# Patient Record
Sex: FEMALE | Employment: UNEMPLOYED | URBAN - METROPOLITAN AREA
[De-identification: names, ages, dates, MRNs, and addresses within clinical notes are randomized per-mention and may not be internally consistent; named-entity substitution may affect disease eponyms.]

---

## 2017-04-26 ENCOUNTER — HOSPITAL ENCOUNTER (INPATIENT)
Age: 22
LOS: 12 days | Discharge: HOME | DRG: 885 | End: 2017-05-08
Attending: PSYCHIATRY & NEUROLOGY | Admitting: PSYCHIATRY & NEUROLOGY
Payer: COMMERCIAL

## 2017-04-26 PROCEDURE — 74011250636 HC RX REV CODE- 250/636: Performed by: PSYCHIATRY & NEUROLOGY

## 2017-04-26 PROCEDURE — 65220000003 HC RM SEMIPRIVATE PSYCH

## 2017-04-26 RX ORDER — LORAZEPAM 1 MG/1
1-2 TABLET ORAL
Status: DISCONTINUED | OUTPATIENT
Start: 2017-04-26 | End: 2017-05-03

## 2017-04-26 RX ORDER — HALOPERIDOL 5 MG/1
5 TABLET ORAL
Status: DISCONTINUED | OUTPATIENT
Start: 2017-04-26 | End: 2017-05-08 | Stop reason: HOSPADM

## 2017-04-26 RX ORDER — LITHIUM CARBONATE 300 MG
300 TABLET ORAL DAILY
Status: DISCONTINUED | OUTPATIENT
Start: 2017-04-27 | End: 2017-04-27

## 2017-04-26 RX ORDER — PROPRANOLOL HYDROCHLORIDE 10 MG/1
20 TABLET ORAL 2 TIMES DAILY
Status: DISCONTINUED | OUTPATIENT
Start: 2017-04-26 | End: 2017-05-08 | Stop reason: HOSPADM

## 2017-04-26 RX ORDER — LORAZEPAM 2 MG/ML
1-2 INJECTION INTRAMUSCULAR
Status: DISCONTINUED | OUTPATIENT
Start: 2017-04-26 | End: 2017-05-08 | Stop reason: HOSPADM

## 2017-04-26 RX ORDER — LITHIUM CARBONATE 300 MG
600 TABLET ORAL
Status: DISCONTINUED | OUTPATIENT
Start: 2017-04-26 | End: 2017-04-27

## 2017-04-26 RX ORDER — HALOPERIDOL 5 MG/ML
5 INJECTION INTRAMUSCULAR
Status: DISCONTINUED | OUTPATIENT
Start: 2017-04-26 | End: 2017-05-08 | Stop reason: HOSPADM

## 2017-04-26 RX ORDER — OLANZAPINE 5 MG/1
10 TABLET, ORALLY DISINTEGRATING ORAL
Status: DISCONTINUED | OUTPATIENT
Start: 2017-04-26 | End: 2017-04-27

## 2017-04-26 RX ORDER — TRAZODONE HYDROCHLORIDE 50 MG/1
50 TABLET ORAL
Status: DISCONTINUED | OUTPATIENT
Start: 2017-04-26 | End: 2017-04-28

## 2017-04-26 RX ADMIN — LORAZEPAM 2 MG: 2 INJECTION INTRAMUSCULAR; INTRAVENOUS at 21:46

## 2017-04-26 RX ADMIN — HALOPERIDOL LACTATE 5 MG: 5 INJECTION, SOLUTION INTRAMUSCULAR at 21:46

## 2017-04-26 NOTE — IP AVS SNAPSHOT
[address] Patient: Amanuel Farrar MRN: [id number] You are allergic to the following No active allergies Recent Documentation Height Weight BMI Smoking Status 1.651 m 72.6 kg 26.63 kg/m2 Current Every Day Smoker Emergency Contacts Name Discharge Info Relation Home Work Mobile Alfred Coon N/A  AT THIS TIME [6] Mother [14] [phone] About your hospitalization You were admitted on:  April 26, 2017 You last received care in the:  [facility] You were discharged on: May 8, 2017 Unit phone number:  [phone] Why you were hospitalized Your primary diagnosis was:  Not on File Your diagnoses also included:  Bipolar 1 Disorder (Hcc) Providers Seen During Your Hospitalizations Provider Role Specialty Primary office phone Kya Batres MD Attending Provider Psychiatry [phone] Your Primary Care Physician (PCP) Primary Care Physician Office Phone Office Fax NONE ** None ** ** None ** Follow-up Information Follow up With Details Comments Contact Info Pt. Has a scheduled therapy appointment with Mrs. Apodaca @ 3:00 on 5/12/17 and  Brian Galan for medication management on 5/24/17 @ 3:00 and  [place] [address] Jaylin [address] [place]za, [place] [address]  Phone: [phone] Pt given card with numbers to remember. Current Discharge Medication List  
  
START taking these medications Dose & Instructions Dispensing Information Comments Morning Noon Evening Bedtime  
 hydrOXYzine pamoate 50 mg capsule Commonly known as:  VISTARIL Your last dose was: Your next dose is:    
   
   
 Dose:  50 mg Take 1 Cap by mouth three (3) times daily as needed for Anxiety for up to 14 days. Indications: anxiety Quantity:  42 Cap Refills:  0  
     
   
   
   
  
 * lithium carbonate 300 mg tablet Replaces:  lithium carbonate 300 mg capsule Your last dose was: Your next dose is:    
   
   
 Dose:  300 mg Take 1 Tab by mouth two (2) times a day for 30 days. Indications: Kalee associated with Bipolar Disorder Quantity:  60 Tab Refills:  0  
     
   
   
   
  
 * lithium carbonate 300 mg tablet Replaces:  lithium carbonate 600 mg capsule Your last dose was: Your next dose is:    
   
   
 Dose:  600 mg Take 2 Tabs by mouth nightly for 30 days. Indications: Kalee associated with Bipolar Disorder Quantity:  60 Tab Refills:  0  
     
   
   
   
  
 * Notice: This list has 2 medication(s) that are the same as other medications prescribed for you. Read the directions carefully, and ask your doctor or other care provider to review them with you. CONTINUE these medications which have CHANGED Dose & Instructions Dispensing Information Comments Morning Noon Evening Bedtime * OLANZapine 20 mg disintegrating tablet Commonly known as:  ZyPREXA zydis What changed:   
- medication strength 
- how much to take Your last dose was: Your next dose is:    
   
   
 Dose:  20 mg Take 1 Tab by mouth nightly for 30 days. Indications: Kalee associated with Bipolar Disorder Quantity:  30 Tab Refills:  0  
     
   
   
   
  
 * OLANZapine 5 mg disintegrating tablet Commonly known as:  ZyPREXA zydis What changed: You were already taking a medication with the same name, and this prescription was added. Make sure you understand how and when to take each. Your last dose was: Your next dose is:    
   
   
 Dose:  5 mg Take 1 Tab by mouth daily for 30 days. Indications: Kalee associated with Bipolar Disorder Quantity:  30 Tab Refills:  0 * Notice: This list has 2 medication(s) that are the same as other medications prescribed for you. Read the directions carefully, and ask your doctor or other care provider to review them with you. CONTINUE these medications which have NOT CHANGED Dose & Instructions Dispensing Information Comments Morning Noon Evening Bedtime  
 propranolol 20 mg tablet Commonly known as:  INDERAL Your last dose was: Your next dose is:    
   
   
 Dose:  20 mg Take 1 Tab by mouth two (2) times a day for 30 days. Indications: SINUS TACHYCARDIA SECONDARY TO ANTIPSYCHOTIC MEDICATION Quantity:  60 Tab Refills:  0 STOP taking these medications   
 lithium carbonate 300 mg capsule Replaced by:  lithium carbonate 300 mg tablet  
   
  
 lithium carbonate 600 mg capsule Replaced by:  lithium carbonate 300 mg tablet Where to Get Your Medications Information on where to get these meds will be given to you by the nurse or doctor. ! Ask your nurse or doctor about these medications  
  hydrOXYzine pamoate 50 mg capsule  
 lithium carbonate 300 mg tablet  
 lithium carbonate 300 mg tablet OLANZapine 20 mg disintegrating tablet OLANZapine 5 mg disintegrating tablet  
 propranolol 20 mg tablet Discharge Instructions BEHAVIORAL HEALTH NURSING DISCHARGE NOTE The following personal items collected during your admission are returned to you:  
Dental Appliance: Dental Appliances: None Vision:   
Hearing Aid:   
Jewelry: Jewelry: None Clothing: Clothing: Pants, Shirt (1 pr yellow flip-flops) Other Valuables: Other Valuables: None Valuables sent to safe:   
 
 
PATIENT INSTRUCTIONS: 
 
. Regular diet The discharge information has been reviewed with the patient. The patient verbalized understanding. Patient armband removed and shredded Discharge Orders None MyChart Announcement We are excited to announce that we are making your provider's discharge notes available to you in [employer]. You will see these notes when they are completed and signed by the physician that discharged you from your recent hospital stay. If you have any questions or concerns about any information you see in [employer], please call the Health Information Department where you were seen or reach out to your Primary Care Provider for more information about your plan of care. Introducing [facility]! Dear Connor Lyn: Thank you for requesting a [employer] account. Our records indicate that you already have an active [employer] account. You can access your account anytime at [email or web address] Did you know that you can access your hospital and ER discharge instructions at any time in [employer]? You can also review all of your test results from your hospital stay or ER visit. Additional Information If you have questions, please visit the Frequently Asked Questions section of the [employer] website at [email or web address] Remember, [employer] is NOT to be used for urgent needs. For medical emergencies, dial 911. Now available from your iPhone and Android! General Information Please provide this summary of care documentation to your next provider. Patient Signature:  ____________________________________________________________ Date:  ____________________________________________________________  
  
Mel Dallas Provider Signature:  ____________________________________________________________ Date:  ____________________________________________________________

## 2017-04-26 NOTE — IP AVS SNAPSHOT
Current Discharge Medication List  
  
START taking these medications Dose & Instructions Dispensing Information Comments Morning Noon Evening Bedtime  
 hydrOXYzine pamoate 50 mg capsule Commonly known as:  VISTARIL Your last dose was: Your next dose is:    
   
   
 Dose:  50 mg Take 1 Cap by mouth three (3) times daily as needed for Anxiety for up to 14 days. Indications: anxiety Quantity:  42 Cap Refills:  0  
     
   
   
   
  
 * lithium carbonate 300 mg tablet Replaces:  lithium carbonate 300 mg capsule Your last dose was: Your next dose is:    
   
   
 Dose:  300 mg Take 1 Tab by mouth two (2) times a day for 30 days. Indications: Kalee associated with Bipolar Disorder Quantity:  60 Tab Refills:  0  
     
   
   
   
  
 * lithium carbonate 300 mg tablet Replaces:  lithium carbonate 600 mg capsule Your last dose was: Your next dose is:    
   
   
 Dose:  600 mg Take 2 Tabs by mouth nightly for 30 days. Indications: Kalee associated with Bipolar Disorder Quantity:  60 Tab Refills:  0  
     
   
   
   
  
 * Notice: This list has 2 medication(s) that are the same as other medications prescribed for you. Read the directions carefully, and ask your doctor or other care provider to review them with you. CONTINUE these medications which have CHANGED Dose & Instructions Dispensing Information Comments Morning Noon Evening Bedtime * OLANZapine 20 mg disintegrating tablet Commonly known as:  ZyPREXA zydis What changed:   
- medication strength 
- how much to take Your last dose was: Your next dose is:    
   
   
 Dose:  20 mg Take 1 Tab by mouth nightly for 30 days. Indications: Kalee associated with Bipolar Disorder Quantity:  30 Tab Refills:  0  
     
   
   
   
  
 * OLANZapine 5 mg disintegrating tablet Commonly known as:  ZyPREXA zydis What changed: You were already taking a medication with the same name, and this prescription was added. Make sure you understand how and when to take each. Your last dose was: Your next dose is:    
   
   
 Dose:  5 mg Take 1 Tab by mouth daily for 30 days. Indications: Kalee associated with Bipolar Disorder Quantity:  30 Tab Refills:  0  
     
   
   
   
  
 * Notice: This list has 2 medication(s) that are the same as other medications prescribed for you. Read the directions carefully, and ask your doctor or other care provider to review them with you. CONTINUE these medications which have NOT CHANGED Dose & Instructions Dispensing Information Comments Morning Noon Evening Bedtime  
 propranolol 20 mg tablet Commonly known as:  INDERAL Your last dose was: Your next dose is:    
   
   
 Dose:  20 mg Take 1 Tab by mouth two (2) times a day for 30 days. Indications: SINUS TACHYCARDIA SECONDARY TO ANTIPSYCHOTIC MEDICATION Quantity:  60 Tab Refills:  0 STOP taking these medications   
 lithium carbonate 300 mg capsule Replaced by:  lithium carbonate 300 mg tablet  
   
  
 lithium carbonate 600 mg capsule Replaced by:  lithium carbonate 300 mg tablet Where to Get Your Medications Information on where to get these meds will be given to you by the nurse or doctor. ! Ask your nurse or doctor about these medications  
  hydrOXYzine pamoate 50 mg capsule  
 lithium carbonate 300 mg tablet  
 lithium carbonate 300 mg tablet OLANZapine 20 mg disintegrating tablet OLANZapine 5 mg disintegrating tablet  
 propranolol 20 mg tablet

## 2017-04-26 NOTE — IP AVS SNAPSHOT
Summary of Care Report The Summary of Care report has been created to help improve care coordination. Users with access to [employer] or [address] (Web-based application) may access additional patient information including the Discharge Summary. If you are not currently a [address] user and need more information, please call the number listed below in the [place] [address] section and ask to be connected with Medical Records. Facility Information Name Address Phone [place] [address] [facility] [address] [phone] Patient Information Patient Name Sex  Uri Vieyra ([id number]) Female 1995 Discharge Information Admitting Provider Service Area Unit Dania June MD / Brigitte Patton [address] / [phone] Discharge Provider Discharge Date/Time Discharge Disposition Destination (none) 2017 Evening (Pending) AHR (none) Patient Language Language ENGLISH [13] Hospital Problems as of 2017  Never Reviewed Class Noted - Resolved Last Modified POA Active Problems Bipolar 1 disorder [facility]  2016 - Present 2017 by Dania June MD Unknown Entered by Shelley Amor MD  
  
You are allergic to the following No active allergies Current Discharge Medication List  
  
START taking these medications Dose & Instructions Dispensing Information Comments  
 hydrOXYzine pamoate 50 mg capsule Commonly known as:  VISTARIL Dose:  50 mg Take 1 Cap by mouth three (3) times daily as needed for Anxiety for up to 14 days. Indications: anxiety Quantity:  42 Cap Refills:  0  
   
 * lithium carbonate 300 mg tablet Replaces:  lithium carbonate 300 mg capsule  Dose:  300 mg  
 Take 1 Tab by mouth two (2) times a day for 30 days. Indications: Kalee associated with Bipolar Disorder Quantity:  60 Tab Refills:  0  
   
 * lithium carbonate 300 mg tablet Replaces:  lithium carbonate 600 mg capsule Dose:  600 mg Take 2 Tabs by mouth nightly for 30 days. Indications: Kalee associated with Bipolar Disorder Quantity:  60 Tab Refills:  0  
   
 * Notice: This list has 2 medication(s) that are the same as other medications prescribed for you. Read the directions carefully, and ask your doctor or other care provider to review them with you. CONTINUE these medications which have CHANGED Dose & Instructions Dispensing Information Comments * OLANZapine 20 mg disintegrating tablet Commonly known as:  ZyPREXA zydis What changed:   
- medication strength 
- how much to take Dose:  20 mg Take 1 Tab by mouth nightly for 30 days. Indications: Kalee associated with Bipolar Disorder Quantity:  30 Tab Refills:  0  
   
 * OLANZapine 5 mg disintegrating tablet Commonly known as:  ZyPREXA zydis What changed: You were already taking a medication with the same name, and this prescription was added. Make sure you understand how and when to take each. Dose:  5 mg Take 1 Tab by mouth daily for 30 days. Indications: Kalee associated with Bipolar Disorder Quantity:  30 Tab Refills:  0  
   
 * Notice: This list has 2 medication(s) that are the same as other medications prescribed for you. Read the directions carefully, and ask your doctor or other care provider to review them with you. CONTINUE these medications which have NOT CHANGED Dose & Instructions Dispensing Information Comments  
 propranolol 20 mg tablet Commonly known as:  INDERAL Dose:  20 mg Take 1 Tab by mouth two (2) times a day for 30 days. Indications: SINUS TACHYCARDIA SECONDARY TO ANTIPSYCHOTIC MEDICATION Quantity:  60 Tab Refills:  0 STOP taking these medications Comments  
 lithium carbonate 300 mg capsule Replaced by:  lithium carbonate 300 mg tablet  
   
   
 lithium carbonate 600 mg capsule Replaced by:  lithium carbonate 300 mg tablet Current Immunizations Name Date Influenza Vaccine (Quad) PF 11/18/2016 Follow-up Information Follow up With Details Comments Contact Info Pt. Has a scheduled therapy appointment with Mrs. Apodaca @ 3:00 on 5/12/17 and  Adrián Neil for medication management on 5/24/17 @ 3:00 and  [address] [facility] [address]  Phone: [phone] Pt given card with numbers to remember. Discharge Instructions BEHAVIORAL HEALTH NURSING DISCHARGE NOTE The following personal items collected during your admission are returned to you:  
Dental Appliance: Dental Appliances: None Vision:   
Hearing Aid:   
Jewelry: Jewelry: None Clothing: Clothing: Pants, Shirt (1 pr yellow flip-flops) Other Valuables: Other Valuables: None Valuables sent to safe:   
 
 
PATIENT INSTRUCTIONS: 
 
. Regular diet The discharge information has been reviewed with the patient. The patient verbalized understanding. Patient armband removed and shredded Chart Review Routing History Recipient Method Report Sent By Jenny Mike MD  
Phone: [phone] [employer] [address] [place], [place] [[id number]] 4/14/2016  8:38 AM 04/14/2016

## 2017-04-27 LAB
ALBUMIN SERPL BCP-MCNC: 4.1 G/DL (ref 3.4–5)
ALBUMIN/GLOB SERPL: 1 {RATIO} (ref 0.8–1.7)
ALP SERPL-CCNC: 79 U/L (ref 45–117)
ALT SERPL-CCNC: 46 U/L (ref 13–56)
AMPHET UR QL SCN: NEGATIVE
ANION GAP BLD CALC-SCNC: 7 MMOL/L (ref 3–18)
APPEARANCE UR: CLEAR
AST SERPL W P-5'-P-CCNC: 34 U/L (ref 15–37)
BACTERIA URNS QL MICRO: ABNORMAL /HPF
BARBITURATES UR QL SCN: NEGATIVE
BASOPHILS # BLD AUTO: 0 K/UL (ref 0–0.1)
BASOPHILS # BLD: 0 % (ref 0–2)
BENZODIAZ UR QL: NEGATIVE
BILIRUB SERPL-MCNC: 1.3 MG/DL (ref 0.2–1)
BILIRUB UR QL: NEGATIVE
BUN SERPL-MCNC: 10 MG/DL (ref 7–18)
BUN/CREAT SERPL: 9 (ref 12–20)
CALCIUM SERPL-MCNC: 9.6 MG/DL (ref 8.5–10.1)
CANNABINOIDS UR QL SCN: POSITIVE
CHLORIDE SERPL-SCNC: 102 MMOL/L (ref 100–108)
CO2 SERPL-SCNC: 29 MMOL/L (ref 21–32)
COCAINE UR QL SCN: NEGATIVE
COLOR UR: YELLOW
CREAT SERPL-MCNC: 1.11 MG/DL (ref 0.6–1.3)
DIFFERENTIAL METHOD BLD: ABNORMAL
EOSINOPHIL # BLD: 0.1 K/UL (ref 0–0.4)
EOSINOPHIL NFR BLD: 1 % (ref 0–5)
EPITH CASTS URNS QL MICRO: ABNORMAL /LPF (ref 0–5)
ERYTHROCYTE [DISTWIDTH] IN BLOOD BY AUTOMATED COUNT: 12.9 % (ref 11.6–14.5)
GLOBULIN SER CALC-MCNC: 4.2 G/DL (ref 2–4)
GLUCOSE SERPL-MCNC: 85 MG/DL (ref 74–99)
GLUCOSE UR STRIP.AUTO-MCNC: NEGATIVE MG/DL
HCG SERPL QL: NEGATIVE
HCT VFR BLD AUTO: 40.5 % (ref 35–45)
HDSCOM,HDSCOM: ABNORMAL
HGB BLD-MCNC: 14.3 G/DL (ref 12–16)
HGB UR QL STRIP: ABNORMAL
KETONES UR QL STRIP.AUTO: NEGATIVE MG/DL
LEUKOCYTE ESTERASE UR QL STRIP.AUTO: NEGATIVE
LITHIUM SERPL-SCNC: <0.2 MMOL/L (ref 0.6–1.2)
LYMPHOCYTES # BLD AUTO: 26 % (ref 21–52)
LYMPHOCYTES # BLD: 3.3 K/UL (ref 0.9–3.6)
MCH RBC QN AUTO: 32.1 PG (ref 24–34)
MCHC RBC AUTO-ENTMCNC: 35.3 G/DL (ref 31–37)
MCV RBC AUTO: 90.8 FL (ref 74–97)
METHADONE UR QL: NEGATIVE
MONOCYTES # BLD: 1.4 K/UL (ref 0.05–1.2)
MONOCYTES NFR BLD AUTO: 11 % (ref 3–10)
NEUTS SEG # BLD: 7.9 K/UL (ref 1.8–8)
NEUTS SEG NFR BLD AUTO: 62 % (ref 40–73)
NITRITE UR QL STRIP.AUTO: NEGATIVE
OPIATES UR QL: NEGATIVE
PCP UR QL: NEGATIVE
PH UR STRIP: 6 [PH] (ref 5–8)
PLATELET # BLD AUTO: 219 K/UL (ref 135–420)
PMV BLD AUTO: 11.2 FL (ref 9.2–11.8)
POTASSIUM SERPL-SCNC: 3.5 MMOL/L (ref 3.5–5.5)
PROT SERPL-MCNC: 8.3 G/DL (ref 6.4–8.2)
PROT UR STRIP-MCNC: NEGATIVE MG/DL
RBC # BLD AUTO: 4.46 M/UL (ref 4.2–5.3)
SODIUM SERPL-SCNC: 138 MMOL/L (ref 136–145)
SP GR UR REFRACTOMETRY: 1.01 (ref 1–1.03)
TSH SERPL DL<=0.05 MIU/L-ACNC: 0.55 UIU/ML (ref 0.36–3.74)
UROBILINOGEN UR QL STRIP.AUTO: 1 EU/DL (ref 0.2–1)
WBC # BLD AUTO: 12.7 K/UL (ref 4.6–13.2)
WBC URNS QL MICRO: ABNORMAL /HPF (ref 0–4)

## 2017-04-27 PROCEDURE — 74011250637 HC RX REV CODE- 250/637: Performed by: PSYCHIATRY & NEUROLOGY

## 2017-04-27 PROCEDURE — 87086 URINE CULTURE/COLONY COUNT: CPT | Performed by: PSYCHIATRY & NEUROLOGY

## 2017-04-27 PROCEDURE — 81001 URINALYSIS AUTO W/SCOPE: CPT | Performed by: PSYCHIATRY & NEUROLOGY

## 2017-04-27 PROCEDURE — 80307 DRUG TEST PRSMV CHEM ANLYZR: CPT | Performed by: PSYCHIATRY & NEUROLOGY

## 2017-04-27 PROCEDURE — 84703 CHORIONIC GONADOTROPIN ASSAY: CPT | Performed by: PSYCHIATRY & NEUROLOGY

## 2017-04-27 PROCEDURE — 80178 ASSAY OF LITHIUM: CPT

## 2017-04-27 PROCEDURE — 36415 COLL VENOUS BLD VENIPUNCTURE: CPT

## 2017-04-27 PROCEDURE — 80053 COMPREHEN METABOLIC PANEL: CPT

## 2017-04-27 PROCEDURE — 65220000003 HC RM SEMIPRIVATE PSYCH

## 2017-04-27 PROCEDURE — 84443 ASSAY THYROID STIM HORMONE: CPT

## 2017-04-27 PROCEDURE — 85025 COMPLETE CBC W/AUTO DIFF WBC: CPT

## 2017-04-27 RX ORDER — LITHIUM CARBONATE 300 MG
300 TABLET ORAL 3 TIMES DAILY
Status: DISCONTINUED | OUTPATIENT
Start: 2017-04-27 | End: 2017-05-02

## 2017-04-27 RX ORDER — OLANZAPINE 5 MG/1
10 TABLET, ORALLY DISINTEGRATING ORAL
Status: DISCONTINUED | OUTPATIENT
Start: 2017-04-27 | End: 2017-04-28

## 2017-04-27 RX ORDER — OLANZAPINE 5 MG/1
5 TABLET, ORALLY DISINTEGRATING ORAL DAILY
Status: DISCONTINUED | OUTPATIENT
Start: 2017-04-27 | End: 2017-05-08 | Stop reason: HOSPADM

## 2017-04-27 RX ADMIN — LITHIUM CARBONATE 300 MG: 300 TABLET ORAL at 14:11

## 2017-04-27 RX ADMIN — LITHIUM CARBONATE 300 MG: 300 TABLET ORAL at 21:31

## 2017-04-27 RX ADMIN — PROPRANOLOL HYDROCHLORIDE 20 MG: 10 TABLET ORAL at 11:50

## 2017-04-27 RX ADMIN — PROPRANOLOL HYDROCHLORIDE 20 MG: 10 TABLET ORAL at 21:32

## 2017-04-27 RX ADMIN — OLANZAPINE 5 MG: 5 TABLET, ORALLY DISINTEGRATING ORAL at 14:11

## 2017-04-27 RX ADMIN — LORAZEPAM 2 MG: 1 TABLET ORAL at 22:22

## 2017-04-27 RX ADMIN — OLANZAPINE 10 MG: 5 TABLET, ORALLY DISINTEGRATING ORAL at 21:32

## 2017-04-27 NOTE — PROGRESS NOTES
Patient escorted to [facility] by police and [facility] security officer; patient yelling and screaming \"that's not my name!\" upon arrival to unit; angry/perplexed affect; shortly afterwards, patient refused to speak and she declined to answer questions for admission; patient is TDO status; patient offered dinner meal and tolerated well 25 % of meal and 2 cups of apple juice. Patient oriented to room, but returned to sit in the day area; will monitor and maintain safe environment.

## 2017-04-27 NOTE — BSMART NOTE
GROUP THERAPY PROGRESS NOTE    Connor Goldie is participating in [place] and [employer].      Group time: 30 minutes    Personal goal for participation: to go home     Goal orientation: community    Group therapy participation: active    Therapeutic interventions reviewed and discussed: Unit rules and guidelines/positive thoughts     Impression of participation: pt attended group but did not communicat with peers

## 2017-04-27 NOTE — PROGRESS NOTES
Patient has been experience mood swings today. Patient was on phone with family member and became agitated with raised voices and swearing. Discussed with staff familial issues and said\"she'd had a bad day. \" Staff encouraged healthy expression techniques. Patient has reverted to a non-communicative behavior.

## 2017-04-27 NOTE — H&P
BEHAVIORAL HEALTH HISTORY AND PHYSICAL    Patient: Timo Vazquez               Sex: female          DOA: 4/26/2017       YOB: 1995      Age:  23 y.o.        LOS:  LOS: 1 day     HISTORY OF PRESENT ILLNESS:     Ms. Keanu Olivera is a 23 y.o.   female who presents on a TDO with a 3-4 months of bizzare behavior; came in to the ED on an ECO after she as reported in the ED, \"jumped out of a car and then got into another vehicle of unknown persons and started removing her clothing. The police were called and patient was brought to the police station and charged with disruptive behavior. Patient's mom arrived and the police were going to release the patient into mom's custody. Patient became very upset with her mom. She pulled mom's hair and grabbed the car keys out of her hand. It is reported that she ran out the door started the car but the car in drive and started to drive the car towards another police car. At this point patient was apprehended by the police and placed on an emergency hold pending further evaluation for dangerous behavior\". She has a long standing history of Schizoaffective D/O Bipolar type, last seen here in 11/2016, discharged on Lithium and Zyprexa. She has not been compliant with medications, and or appointments. She was supposed to live with her grandmother, who put her out because of continued bizzare behavior, fueled by medication noncompliance. There is also a hx of chrinic THC use. On interview today, she is bizzare, responding to internal stimuli; restless, walking up and down, moving from chair to chair till she finally settles down; screaming at questions asked of her, with little or no answers; then she walks out of interview room to call her Mother, and ends up screaming at her so loud with extreme profanity. She then sits up, inappropriately displaying her undergarments, and oblivious to such; says she is only going to take Lithium, Inderal and Zyprexa, then walks out of he room. Past Psych Hx; this is apparent admission #3. Denies any hx of self harm or homicidal behaviors. No OPT provider. Last discharge meds from [facility] back in 11/2016, was Lithium 300 in AM, 600 in HS, as well as Zyprexa 15 mg in HS, with Inderal 20 BID for tachycardia. Lithium level today is less than 0.2 She is not pregnant. Legal Hx; She has a recent arrest for disorderly conduct, says she has to go to court on 5/8/17. Currently not on probation. S/A Hx; Usually THC. Awaiting UDS today, as well as urinalysis BAL was less than 3. Social Hx; Says she has a HSD. No college. Never . Has a 9year old daughter, who resides with an aunt who has custody. She is #2 in a family of 4. She never knew her biological Father; raised by Mom and stepdad. 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 On SSD. Family History; She says Mom is also with Bipolar disorder, takes medications she does not know of. Active Problems:    Bipolar 1 disorder ([facility] [address] [facility] (11/12/2016)         Past Medical History:   Diagnosis Date    Aggressive outburst     Anxiety disorder     Bipolar 1 disorder ([facility] [facility]     Depression     Depression     Diabetes ([facility] [facility]     Suicidal thoughts         No past surgical history on file. Social History   Substance Use Topics    Smoking status: Current Every Day Smoker    Smokeless tobacco: Never Used    Alcohol use Yes        Family History   Problem Relation Age of Onset    No Known Problems Mother     No Known Problems Father         No Known Allergies     Prior to Admission medications    Medication Sig Start Date End Date Taking? Authorizing Provider   lithium carbonate 300 mg capsule Take 1 Cap by mouth daily. Indications: BIPOLAR DISORDER 11/18/16   Brenton Mittal MD   lithium carbonate 600 mg capsule Take 1 Cap by mouth nightly.  Indications: BIPOLAR DISORDER 11/18/16   Brenton Mittal MD   OLANZapine [facility] ZYDIS) 15 mg disintegrating tablet Take 1 Tab by mouth nightly. Indications: Mood stabilization 11/18/16   Ayde Miller MD   propranolol (INDERAL) 20 mg tablet Take 1 Tab by mouth two (2) times a day. Indications: SINUS TACHYCARDIA SECONDARY TO ANTIPSYCHOTIC MEDICATION 11/18/16   Ayde Miller MD       VITALS:    Visit Vitals    /78 (BP 1 Location: Right arm, BP Patient Position: Sitting)    Pulse 98    Temp 97.4 °F (36.3 °C)    Resp 22       Labs:  Results for orders placed or performed during the hospital encounter of 04/26/17   CBC WITH AUTOMATED DIFF   Result Value Ref Range    WBC 12.7 4.6 - 13.2 K/uL    RBC 4.46 4.20 - 5.30 M/uL    HGB 14.3 12.0 - 16.0 g/dL    HCT 40.5 35.0 - 45.0 %    MCV 90.8 74.0 - 97.0 FL    MCH 32.1 24.0 - 34.0 PG    MCHC 35.3 31.0 - 37.0 g/dL    RDW 12.9 11.6 - 14.5 %    PLATELET 240 827 - 805 K/uL    MPV 11.2 9.2 - 11.8 FL    NEUTROPHILS 62 40 - 73 %    LYMPHOCYTES 26 21 - 52 %    MONOCYTES 11 (H) 3 - 10 %    EOSINOPHILS 1 0 - 5 %    BASOPHILS 0 0 - 2 %    ABS. NEUTROPHILS 7.9 1.8 - 8.0 K/UL    ABS. LYMPHOCYTES 3.3 0.9 - 3.6 K/UL    ABS. MONOCYTES 1.4 (H) 0.05 - 1.2 K/UL    ABS. EOSINOPHILS 0.1 0.0 - 0.4 K/UL    ABS. BASOPHILS 0.0 0.0 - 0.1 K/UL    DF AUTOMATED     METABOLIC PANEL, COMPREHENSIVE   Result Value Ref Range    Sodium 138 136 - 145 mmol/L    Potassium 3.5 3.5 - 5.5 mmol/L    Chloride 102 100 - 108 mmol/L    CO2 29 21 - 32 mmol/L    Anion gap 7 3.0 - 18 mmol/L    Glucose 85 74 - 99 mg/dL    BUN 10 7.0 - 18 MG/DL    Creatinine 1.11 0.6 - 1.3 MG/DL    BUN/Creatinine ratio 9 (L) 12 - 20      GFR est AA >60 >60 ml/min/1.73m2    GFR est non-AA >60 >60 ml/min/1.73m2    Calcium 9.6 8.5 - 10.1 MG/DL    Bilirubin, total 1.3 (H) 0.2 - 1.0 MG/DL    ALT (SGPT) 46 13 - 56 U/L    AST (SGOT) 34 15 - 37 U/L    Alk.  phosphatase 79 45 - 117 U/L    Protein, total 8.3 (H) 6.4 - 8.2 g/dL    Albumin 4.1 3.4 - 5.0 g/dL    Globulin 4.2 (H) 2.0 - 4.0 g/dL    A-G Ratio 1.0 0.8 - 1.7     TSH 3RD GENERATION   Result Value Ref Range    TSH 0.55 0.36 - 3.74 uIU/mL   LITHIUM   Result Value Ref Range    Lithium <0.20 (L) 0.6 - 1.2 MMOL/L   HCG QL SERUM   Result Value Ref Range    HCG, Ql. NEGATIVE  NEG       PSYCHIATRIC HISTORY:  DIAGNOSIS: Schizoaffective Disorder, Bipolar type  CURRENT PSYCHIATRIST; None  THERAPIST: None  ADMISSIONS: None  SUICIDE ATTEMPTS:Denies      REVIEW OF SYSTEMS:     GENERAL:Patient alert, awake and oriented times 3, able to communicate full sentences and not in distress. HEENT: No change in vision, no earache, tinnitus, sore throat or sinus congestion. NECK: No pain or stiffness. PULMONARY: No shortness of breath, cough or wheeze. GASTROINTESTINAL: No abdominal pain, nausea, vomiting or diarrhea, melena or bright red blood per rectum. GENITOURINARY: No urinary frequency, urgency, hesitancy or dysuria. MUSCULOSKELETAL: No joint or muscle pain, no back pain, no recent trauma. DERMATOLOGIC: No rash, no itching, no lesions. ENDOCRINE: No polyuria, polydipsia, no heat or cold intolerance. No recent change in weight. HEMATOLOGICAL: No anemia or easy bruising or bleeding. \NEUROLOGIC: No headache, seizures, numbness, tingling or weakness. \denies f/c, pain, n/v, d/c, SOB, CP, weakness/numbness, difficulty urinating    MINI MENTAL STATUS EXAM: :   Orientation- Oriented in all spheres  Short-term memory: is impaired  Attention:Distractible  Repeat phrase \"no ifs, ands, or buts. \"  Follow three stage command- follow written command (CLOSE YOUR EYES)\- write a spontaneous sentence  Copy a simple design      MENTAL STATUS EXAM:  Appearance:is unkempt, shows poor hygiene and is bizarre  Behavior: is agitated, is manic , shows poor impulse control, shows poor eye contact, is restless and is combative  Motor: hyperactive and restless  Speech: is pressured and is loud  Mood: angry, depressed, irritable and labile  Affect: Mood congruent  Thought Process: shows a flight of ideas and is circumstantial  Thought Content: paranoia  Perception:auditory , visual and hallucinations  Cognition: decreased attention/concentration, impaired decision making and impulsive  Insight: The patient shows little insight  Judgment: is psychologically impaired and is cognitively impaired    RISK ASSESSMENT:   Prior Attempts: NO  Lethality of Attempts: NO  Weapons at [address] at Home: NO  Alcohol/Drug Use: YES  Protective Factors:children     CURRENT MEDICATIONS:  Current Facility-Administered Medications   Medication Dose Route Frequency    lithium carbonate tablet 300 mg  300 mg Oral TID    OLANZapine (ZyPREXA zydis) disintegrating tablet 10 mg  10 mg Oral QHS    OLANZapine (ZyPREXA zydis) disintegrating tablet 5 mg  5 mg Oral DAILY    propranolol (INDERAL) tablet 20 mg  20 mg Oral BID       ASSESSMENT: Patient is a 23 y.o.  female who presents on a TDO with a 3-4 months of bizzare behavior; came in to the ED on an ECO after she as reported in the ED, \"jumped out of a car and then got into another vehicle of unknown persons and started removing her clothing. The police were called and patient was brought to the police station and charged with disruptive behavior. Patient's mom arrived and the police were going to release the patient into mom's custody. Patient became very upset with her mom. She pulled mom's hair and grabbed the car keys out of her hand. It is reported that she ran out the door started the car but the car in drive and started to drive the car towards another police car. At this point patient was apprehended by the police and placed on an emergency hold pending further evaluation for dangerous behavior\". Axis I:Schizoaffective Disorder, Bipolar type             THC Use Disorder, continuous  Axis II:Deferred  Axis III:Tachycardia  Axis IV:Relatively homeless, fixed income, unemployed, poor social supports, chronic drug use  Axis V:39     Plan:  1.  Continue with inpatient psychiatric treatment  2. Continue with suicide or assault precautions  3. Patient is to continue with Art/OT and family therapy sessions  4. Will need to talk with outpatient psychiatrist/therapist for more collateral  5. Will need to talk with parents for more collateral  6. Medications:Resume Lithium, spread out as 300 mg TID; Zyprexa, 10 HS,target dose of 15 mg as previous home med; Inderal for tachycardia; PRNs as needed for agitation of haldol, benadryl, and cogentin  7. Labs:She needs to complete her labs-U/A, and a UDS;  Others reviewed, TSH, CBC, CMP, unremarkable  8. SW to help with disposition    Disposition:  Home w/Family           ___________________________________________________    Attending Physician: Gabriel Lee MD

## 2017-04-27 NOTE — BH NOTES
GROUP THERAPY PROGRESS NOTE    Luke John is not participating in Recreational Therapy, at staff's discretion; allowing Pt to rest.

## 2017-04-27 NOTE — BH NOTES
Urine specimen collected kelsey, clear, for UA,C&S. sent to lab.pt denies pain, afebrile, pt ambulatory, offered juice and water all shift, tolerating well.

## 2017-04-27 NOTE — H&P
History and Physical        Patient: Chichi Kulkarni               Sex: female          DOA: 4/26/2017         YOB: 1995      Age:  23 y.o.        LOS:  LOS: 1 day        HPI:     Chichi Kulkarni is a 23 y.o. female who was admitted under TDO experiencing psychotic, aggressive and bizarre behavior. Active Problems:    Bipolar 1 disorder ([facility] [address] (11/12/2016)        Past Medical History:   Diagnosis Date    Aggressive outburst     Anxiety disorder     Bipolar 1 disorder ([facility] [address] [facility]     Depression     Depression     Diabetes ([facility] [address] [facility]     Suicidal thoughts        No past surgical history on file. Family History   Problem Relation Age of Onset    No Known Problems Mother     No Known Problems Father        Social History     Social History    Marital status: SINGLE     Spouse name: N/A    Number of children: ONE    Years of education: Some college     Social History Main Topics    Smoking status: Current Every Day Smoker    Smokeless tobacco: Never Used    Alcohol use Yes    Drug use: Yes     Special: Marijuana    Sexual activity: Not on file     Other Topics Concern    Not on file     Social History Narrative   Patient states she lives with her grandmother. States her appetite and sleep have been okay. States she is unemployed. Prior to Admission medications    Medication Sig Start Date End Date Taking? Authorizing Provider   lithium carbonate 300 mg capsule Take 1 Cap by mouth daily. Indications: BIPOLAR DISORDER 11/18/16   Demetria Berger MD   lithium carbonate 600 mg capsule Take 1 Cap by mouth nightly. Indications: BIPOLAR DISORDER 11/18/16   Demetria Berger MD   OLANZapine (ZYPREXA ZYDIS) 15 mg disintegrating tablet Take 1 Tab by mouth nightly. Indications: Mood stabilization 11/18/16   Demetria Berger MD   propranolol (INDERAL) 20 mg tablet Take 1 Tab by mouth two (2) times a day.  Indications: SINUS TACHYCARDIA SECONDARY TO ANTIPSYCHOTIC MEDICATION 11/18/16 Jana Redding MD       No Known Allergies    Review of Systems  A comprehensive review of systems was negative. Physical Exam:      Visit Vitals    /78 (BP 1 Location: Right arm, BP Patient Position: Sitting)    Pulse 98    Temp 97.4 °F (36.3 °C)    Resp 22       Physical Exam:    General:  Alert, cooperative, well developed, well nourished, obese, AA female no distress, appears stated age. Eyes:  Conjunctivae/corneas clear. PERRL, EOMs intact. Fundi benign   Ears:  Normal TMs and external ear canals both ears. Nose: Nares normal. Septum midline. Mucosa normal. No drainage or sinus tenderness. Mouth/Throat: Lips, mucosa, and tongue normal. Teeth and gums normal.   Neck: Supple, symmetrical, trachea midline, no adenopathy, thyroid: no enlargement/tenderness/nodules, no carotid bruit and no JVD. Back:   Symmetric, no curvature. ROM normal. No CVA tenderness. Lungs:   Clear to auscultation bilaterally. Heart:  Regular rate and rhythm, S1, S2 normal, no murmur, click, rub or gallop. Abdomen:   Soft, non-tender. Bowel sounds normal. No masses,  No organomegaly. Extremities: Extremities normal, atraumatic, no cyanosis or edema. Pulses: 2+ and symmetric all extremities. Skin: Skin color, texture, turgor normal. No rashes or lesions   Lymph nodes: Cervical, supraclavicular, and axillary nodes normal.   Neurologic: CNII-XII intact. Normal strength, sensation and reflexes throughout.            Assessment/Plan     Psychosis  Aggression  Mutism  Bizarre behavior  Labs reviewed  Continue treatment per physician's orders

## 2017-04-27 NOTE — BSMART NOTE
ACTIVITIES THERAPY PROGRESS NOTE    Group time:1430    Attended about 5 minutes of group and introduced self on request.  Was not in group long enough to participate in activity.

## 2017-04-28 LAB
BACTERIA SPEC CULT: NORMAL
SERVICE CMNT-IMP: NORMAL

## 2017-04-28 PROCEDURE — 74011250637 HC RX REV CODE- 250/637: Performed by: PSYCHIATRY & NEUROLOGY

## 2017-04-28 PROCEDURE — 65220000003 HC RM SEMIPRIVATE PSYCH

## 2017-04-28 PROCEDURE — 74011250636 HC RX REV CODE- 250/636: Performed by: PSYCHIATRY & NEUROLOGY

## 2017-04-28 RX ORDER — CLONAZEPAM 0.5 MG/1
0.5 TABLET ORAL DAILY
Status: DISCONTINUED | OUTPATIENT
Start: 2017-04-28 | End: 2017-05-01

## 2017-04-28 RX ORDER — CLONAZEPAM 0.5 MG/1
1 TABLET ORAL
Status: DISCONTINUED | OUTPATIENT
Start: 2017-04-28 | End: 2017-05-01

## 2017-04-28 RX ORDER — OLANZAPINE 5 MG/1
15 TABLET, ORALLY DISINTEGRATING ORAL
Status: DISCONTINUED | OUTPATIENT
Start: 2017-04-28 | End: 2017-05-01

## 2017-04-28 RX ADMIN — OLANZAPINE 5 MG: 5 TABLET, ORALLY DISINTEGRATING ORAL at 08:22

## 2017-04-28 RX ADMIN — CLONAZEPAM 1 MG: 0.5 TABLET ORAL at 20:46

## 2017-04-28 RX ADMIN — LORAZEPAM 2 MG: 2 INJECTION INTRAMUSCULAR; INTRAVENOUS at 09:01

## 2017-04-28 RX ADMIN — CLONAZEPAM 0.5 MG: 0.5 TABLET ORAL at 11:17

## 2017-04-28 RX ADMIN — PROPRANOLOL HYDROCHLORIDE 20 MG: 10 TABLET ORAL at 20:46

## 2017-04-28 RX ADMIN — PROPRANOLOL HYDROCHLORIDE 20 MG: 10 TABLET ORAL at 08:22

## 2017-04-28 RX ADMIN — LITHIUM CARBONATE 300 MG: 300 TABLET ORAL at 08:22

## 2017-04-28 RX ADMIN — HALOPERIDOL LACTATE 5 MG: 5 INJECTION, SOLUTION INTRAMUSCULAR at 09:01

## 2017-04-28 RX ADMIN — OLANZAPINE 15 MG: 5 TABLET, ORALLY DISINTEGRATING ORAL at 20:46

## 2017-04-28 RX ADMIN — LORAZEPAM 2 MG: 1 TABLET ORAL at 02:16

## 2017-04-28 RX ADMIN — LITHIUM CARBONATE 300 MG: 300 TABLET ORAL at 20:46

## 2017-04-28 NOTE — BH NOTES
Late Entry    SW Contact:  Pt.is a 23year old female with history of Schizoaffective disorder Bipolar type. Pt. Was admitted to this facility for being a danger to self. Pt. Per mother was jumping out of the car banging on the lim of a car and walking in and out of traffic. SW attempted to meet with pt to discuss d/c planning. Pt expressed she was tired and went back to sleep. SW will attempt to met pt. At a later time. SW will contact pt.'s mother for a collateral to address compliance ion the community, baseline and concerns.

## 2017-04-28 NOTE — PROGRESS NOTES
Behavioral Health Progress Note      4/28/2017    Genesis Christopher    Current Diagnosis:  Axis I:Schizoaffective Disorder, Bipolar type  THC Use Disorder, continuous  Axis II:Deferred  Axis III:Tachycardia  Axis IV:Relatively homeless, fixed income, unemployed, poor social supports, chronic drug use  Axis V:39      Report from the nursing staff changes in the medical condition while patient has been on the unit:  See notes from today. Patient Vitals for the past 24 hrs:   Temp Pulse Resp BP   04/28/17 0800 96.4 °F (35.8 °C) 98 18 126/84       Recent Results (from the past 24 hour(s))   URINALYSIS W/ RFLX MICROSCOPIC    Collection Time: 04/27/17  1:50 PM   Result Value Ref Range    Color YELLOW      Appearance CLEAR      Specific gravity 1.011 1.005 - 1.030      pH (UA) 6.0 5.0 - 8.0      Protein NEGATIVE  NEG mg/dL    Glucose NEGATIVE  NEG mg/dL    Ketone NEGATIVE  NEG mg/dL    Bilirubin NEGATIVE  NEG      Blood TRACE (A) NEG      Urobilinogen 1.0 0.2 - 1.0 EU/dL    Nitrites NEGATIVE  NEG      Leukocyte Esterase NEGATIVE  NEG     URINE MICROSCOPIC ONLY    Collection Time: 04/27/17  1:50 PM   Result Value Ref Range    WBC 0 to 3 0 - 4 /hpf    Epithelial cells 1+ 0 - 5 /lpf    Bacteria FEW (A) NEG /hpf   DRUG SCREEN, URINE    Collection Time: 04/27/17  1:50 PM   Result Value Ref Range    BENZODIAZEPINE NEGATIVE  NEG      BARBITURATES NEGATIVE  NEG      THC (TH-CANNABINOL) POSITIVE (A) NEG      OPIATES NEGATIVE  NEG      PCP(PHENCYCLIDINE) NEGATIVE  NEG      COCAINE NEGATIVE  NEG      AMPHETAMINE NEGATIVE  NEG      METHADONE NEGATIVE  NEG      HDSCOM (NOTE)        Sleep:has evidence of insomnia    Intake: Poor    Patient Comments:Still labile, screaming 1 min, tearful the next. Extremely agitated in court this AM. Day 2 of medications since sept.  Will raise Zyprexa HS to 15, from 10, add Klonopin daytime, and nightime to allay anxiety, allow rest; Lithium @ 300 mg TID, consider HS dose to 600 mg if no improvement over the weekend, but not before Sunday. [facility] changed TDO to [facility]. May need [address] by Monday, but so far she is med compliant.      Mental Status Exam:  Appearance:is unkempt, shows poor hygiene and is bizarre  Behavior: is agitated, is manic , shows poor impulse control, shows poor eye contact, is restless and is combative  Motor: hyperactive and restless  Speech: is pressured and is loud  Mood: angry, depressed, irritable and labile  Affect: Mood congruent  Thought Process: shows a flight of ideas and is circumstantial  Thought Content: paranoia  Perception:auditory , visual and hallucinations  Cognition: decreased attention/concentration, impaired decision making and impulsive  Insight: The patient shows little insight  Judgment: is psychologically impaired and is cognitively impaired  Medications:      Treatment Plan:  Continue treatment plan as above    Anticipated Discharge:  ENA Alexander MD  4/28/2017

## 2017-04-28 NOTE — BSMART NOTE
ACTIVITIES THERAPY PROGRESS NOTE    Group time:1120    The patient did not awaken/get up when called for group.

## 2017-04-28 NOTE — BH NOTES
Patient has been sitting in day area requesting personal items. Patient was informed of policy of the unit. Patient whineing and stomptng feet and pouting. Patient was encouraged to talk about feelings instead. Patient stated, \"I'm getting mad, because you won't let me have my way. \"  Reinforced policy of the unit. Patient sat in the chair and was responding to internal stimuli. Patient walked back to room and slammed the door.

## 2017-04-28 NOTE — PROGRESS NOTES
Problem: Psychosis  Goal: *STG: Decreased delusional thinking  Patient will demonstrate improved thought processes daily during hospital stay. Outcome: Progressing Towards Goal  Patient has verbalized visual and auditory hallucinations \"my face is all bound up and distorted\". Patient has been in and out of room with  No clothes on and when verbally redirected responds with agitation and aggressive behaviors. Patient slamming door and banging on windows and it was reported that patient grabbed mothers hair when in court. Patient was escorted back to unit where she received PRN's for continued agitation and aggressive behaviors. Patient has been intimidating to select staff at times throughout the morning with demanding and entitled behaviors. Will continue to monitor for safety, agitation and aggressive behaviors on unit.

## 2017-04-28 NOTE — BSMART NOTE
Patient was involuntarily committed at court this morning. Family attended the TDO hearing. Patient was resistant to leaving the room until she was able to hug her Mother. Pushed past security and hugged her Mother but also grabbed hold of her hair refusing to let go. Grandmother also present tried to get patient to let her Mothers hair go. This writer as well as security and the [address] [place] [address] Street also intervened. Patient refusing to let go until her Mother repeated a phrase about being sorry, that she loves her. Mother repeated phrase to appease her daughter and patient made her repeat again. Finally let go of her Mothers hair. Was escorting out of Court area and patient went to her knees refusing to walk. After a minute or so did get up and walk to unit with this writer and female . Once on the unit, she was being escorted to her assigned room. She refused to go into her room stating \" I don't have a room here. \" Became more agitated and began to make threats to this writer and spouted profanity. Patient with show of force taken to her room by unit staff and received IM prn medications.

## 2017-04-28 NOTE — BH NOTES
At approximately 2138, Pt was heard screaming and yelling by this writer, from the laundry room. Pt had recently spoke to her mother, via phone. This writer, another MHT, and a nurse proceeded down the morales to speak to Pt. Pt stated that \"she lied to me\" (her mother). The situation was quickly deescalated. The nurse encouraged Pt to concern herself with her own actions and not the actions of others. Pt was receptive to the encouragement and was thankful. Pt cried for approximately 10 more minutes and is now lying down quietly in bed. Will continue to monitor for safety and provide 1:1 communication.

## 2017-04-28 NOTE — PROGRESS NOTES
Problem: Falls - Risk of  Goal: *Absence of falls  Patient will remain free of falls daily during hospital stay. Outcome: Progressing Towards Goal  No falls noted. Problem: Psychosis  Goal: *STG/LTG: Complies with medication therapy  Patient will comply with medication daily during hospital.   Outcome: Progressing Towards Goal  Patient is compliant with medications. Problem: Psychosis  Goal: *STG: Decreased delusional thinking  Patient will demonstrate improved thought processes daily during hospital stay. Outcome: Progressing Towards Goal  Patient demonstrated improved thought pattern this pm.    Comments:   During 1:1 interview, patient is alert and oriented x 4; pleasant upon approach; denied hallucinations; denied thoughts of harm to self or others; patient slept most of shift, then came out for dinner which she tolerated 90% of meal; complied with medications; however, patient became verbally loud when talking on the telephone, \"you weren't there. ..you were there! \" patient slammed the phone against the wall, then slammed the door to her room; staff offered verbal redirection and patient was receptive to verbal redirection; patient is calm and verbalized to maintain self-control; patient stated that it is her fault that she is in he hospital, because she didn't do what she supposed to do; patient also given Ativan 2 mg po for agitation; will monitor and maintain safe environment.

## 2017-04-28 NOTE — BH NOTES
SW Contact: Pt.is a 23year old female with history of Schizoaffective disorder Bipolar type. Pt. Was admitted to this facility for being a danger to self. Pt. Per mother was jumping out of the car banging on the lim of a car and walking in and out of traffic. SW attempted to meet with pt to discuss d/c planning. The pt. was asleep.  Pt. had to be medicated earlier today due to behaviors ( labile, throwing objects in her room )

## 2017-04-28 NOTE — BH NOTES
Pt was heard  in room crying. Pt informed this writer that she's upset because she needs to find her purse and phone. Ofelia Ryan are in the car with the people that I was with\". Informed Pt to get some rest tonight and the staff will assist with helping her locate the items, if possible, tomorrow. Pt was receptive and decided to get some rest. Will continue to monitor and provide 1:1 encouragement.

## 2017-04-28 NOTE — BH NOTES
Patient has been up since [facility]. Patient has had 2 showers and has slammed her room door multiple times. Upon talking with patient patient explained that she was hungry and thirsty. Informed patient that she didn't have to slam her door and that nurse and staff are approachable. Encouraged patient to come to the day area and food and drink were provided. PRN medication provided for restlessness and agitation.

## 2017-04-28 NOTE — BH NOTES
GROUP THERAPY PROGRESS NOTE    Lynne Shelton is not participating in [place].      Group time: 30 minutes    Personal goal for participation: none    Goal orientation: community    Group therapy participation: refused    Therapeutic interventions reviewed and discussed: goals and procedures    Impression of participation: encouraged

## 2017-04-29 PROCEDURE — 65220000003 HC RM SEMIPRIVATE PSYCH

## 2017-04-29 PROCEDURE — 74011250637 HC RX REV CODE- 250/637: Performed by: PSYCHIATRY & NEUROLOGY

## 2017-04-29 RX ADMIN — PROPRANOLOL HYDROCHLORIDE 20 MG: 10 TABLET ORAL at 22:17

## 2017-04-29 RX ADMIN — OLANZAPINE 15 MG: 5 TABLET, ORALLY DISINTEGRATING ORAL at 22:17

## 2017-04-29 RX ADMIN — LORAZEPAM 1 MG: 1 TABLET ORAL at 10:02

## 2017-04-29 RX ADMIN — LITHIUM CARBONATE 300 MG: 300 TABLET ORAL at 08:35

## 2017-04-29 RX ADMIN — CLONAZEPAM 0.5 MG: 0.5 TABLET ORAL at 08:35

## 2017-04-29 RX ADMIN — LITHIUM CARBONATE 300 MG: 300 TABLET ORAL at 22:17

## 2017-04-29 RX ADMIN — LITHIUM CARBONATE 300 MG: 300 TABLET ORAL at 13:48

## 2017-04-29 RX ADMIN — PROPRANOLOL HYDROCHLORIDE 20 MG: 10 TABLET ORAL at 08:35

## 2017-04-29 RX ADMIN — OLANZAPINE 5 MG: 5 TABLET, ORALLY DISINTEGRATING ORAL at 08:35

## 2017-04-29 RX ADMIN — HALOPERIDOL 5 MG: 5 TABLET ORAL at 10:02

## 2017-04-29 RX ADMIN — CLONAZEPAM 1 MG: 0.5 TABLET ORAL at 22:17

## 2017-04-29 NOTE — BH NOTES
Patient has been cooperative and less agitated this shift with minimal redirection needed and has not presented as a management issue this shift. Patient has been compliant with prescribed medication. Patient has showered and completed daily hygiene care. Wail continue to monitor for safety with support and education as needed throughout treatment regimen.

## 2017-04-29 NOTE — BH NOTES
GROUP THERAPY PROGRESS NOTE    Abby Sauer is not participating in [place].      Group time: 30 minutes    Personal goal for participation: none    Goal orientation: community    Group therapy participation: disruptive    Therapeutic interventions reviewed and discussed: goals and procedures    Impression of participation: encouraged

## 2017-04-29 NOTE — PROGRESS NOTES
Problem: Psychosis  Goal: *STG/LTG: Complies with medication therapy  Patient will comply with medication daily during hospital.   Outcome: Progressing Towards Goal  Patient has been compliant with prescribed medication. Problem: Psychosis  Goal: *STG: Decreased delusional thinking  Patient will demonstrate improved thought processes daily during hospital stay. Outcome: Progressing Towards Goal  Patient has been making delusional statement throughout the shift. Comments:   Patient has been more calm and cooperative this shift with medicaiton compliance and participation in unit activities. Rosatent has showered and completed daily hygiene care. Patient became increasingly agitated after spending time on phone requiring verbal redirection for banging on windows and walls with phone. Patient cursing and shouting in morales. Patient was argumentative and oppositional and has accepted PRN's for agitation \"I'm so frustrated,  I just get tired of people and not being with my baby\". Patient was apologetic for behaviors and has requested to use phone. Patient was educated regarding expected behaviors on unit and with pone use within the facility. Patient denies suicidal ideation, denies auditory hallucination. Will continue to monitor for safety with support as needed throughout treatment regimen.

## 2017-04-29 NOTE — BH NOTES
GROUP THERAPY PROGRESS NOTE    Quyenshlomo Candi is not participating in Recreational Therapy, at staff discretion.

## 2017-04-29 NOTE — BH NOTES
GROUP THERAPY PROGRESS NOTE    Vero Hsieh is participating in [place]. Group time: 15 minutes    Personal goal for participation: Community Annoucement    Goal orientation: community    Group therapy participation: active    Therapeutic interventions reviewed and discussed: Discussed unit schedule, visiting hours, housekeeping/maintenance issues, addressed program concerns and unit guidelines. Impression of participation: Pt did not have issues or concerns at this time.

## 2017-04-30 PROCEDURE — 65220000003 HC RM SEMIPRIVATE PSYCH

## 2017-04-30 PROCEDURE — 74011250637 HC RX REV CODE- 250/637: Performed by: PSYCHIATRY & NEUROLOGY

## 2017-04-30 RX ADMIN — PROPRANOLOL HYDROCHLORIDE 20 MG: 10 TABLET ORAL at 20:38

## 2017-04-30 RX ADMIN — CLONAZEPAM 0.5 MG: 0.5 TABLET ORAL at 08:17

## 2017-04-30 RX ADMIN — OLANZAPINE 15 MG: 5 TABLET, ORALLY DISINTEGRATING ORAL at 20:38

## 2017-04-30 RX ADMIN — CLONAZEPAM 1 MG: 0.5 TABLET ORAL at 20:38

## 2017-04-30 RX ADMIN — LITHIUM CARBONATE 300 MG: 300 TABLET ORAL at 08:17

## 2017-04-30 RX ADMIN — LITHIUM CARBONATE 300 MG: 300 TABLET ORAL at 14:35

## 2017-04-30 RX ADMIN — LITHIUM CARBONATE 300 MG: 300 TABLET ORAL at 20:39

## 2017-04-30 RX ADMIN — PROPRANOLOL HYDROCHLORIDE 20 MG: 10 TABLET ORAL at 08:17

## 2017-04-30 RX ADMIN — OLANZAPINE 5 MG: 5 TABLET, ORALLY DISINTEGRATING ORAL at 08:17

## 2017-04-30 NOTE — BH NOTES
GROUP THERAPY PROGRESS NOTE    Param Mancilla is participating in Medication & Discharge education group. Group time: 30 minutes    Personal goal for participation: Understanding discharge & Medication compliance. Goal orientation: community    Group therapy participation: active    Therapeutic interventions reviewed and discussed: Understanding the discharge process and the importance of Medication compliance.

## 2017-04-30 NOTE — BH NOTES
GROUP THERAPY PROGRESS NOTE    Dilcia Castañeda is participating in Recreational Therapy. Group time: 45 minutes    Personal goal for participation: Social,relax,exercise    Goal orientation: social    Group therapy participation: active    Therapeutic interventions reviewed and discussed: Social,relax,exercise    Impression of participation: Pt sang songs with another peer and played basketball. Pt was social with peers and staff:cheerful and in good spirits.

## 2017-04-30 NOTE — PROGRESS NOTES
Problem: Psychosis  Goal: *STG: Participates in individual and group therapy  Patient will attend and participate in 2-3 groups daily while in the hospital.   Outcome: Not Progressing Towards Goal  Patient has not been participating in unit activities. Goal: *STG/LTG: Complies with medication therapy  Patient will comply with medication daily during hospital.   Outcome: Progressing Towards Goal  Patient hs been compliant with prescribed medication. Comments:   Patient has been cooperative with medications however does not participate in unit activities. Patient required verbal redirection for agitation AEB throwing cups of water with ice down the morales. Patient was not receptive to verbal redirection initially however was able to calm down a short time later and has been other wise cooperative throughout the shift. Patient has showered and completed daily hygiene care this shift. Patient has reported eating and sleeping without difficulty. Patient denies suicidal ideation with no safety issues noted. Will continue to monitor for safety with support as needed throughout treatment regimen.

## 2017-04-30 NOTE — BH NOTES
Psychiatry progress note    Chart reviewed, patient interviewed. Patient stated she was in the hospital because she stopped taking her meds. Says she is feeling okay. Patient reports she is taking Ativan, a blood pressure med and \"something else\". Patient complained she is having trouble sleeping. Sleep was broken. Patient asked about possible med change but wants her meds left alone. Made a bizarre remark, Sukhdev Askew you sure you are on my side? \". On exam, upset and labile. Paranoid. No SI, HI or AVH. Insight and judgment questionable. Meds are Klonopin 0.5 qDay and 1 qHS; Lithium 300 TID, Zydis 15 qHS and 5 qDay. Assessment - Schizoaffective Disorder, Bipolar type; THC Use Disorder, continuous. Difficult to gauge. May have some residual psychosis. Plan is to continue current treatment plan.

## 2017-04-30 NOTE — BH NOTES
Pt a lot calmer this shift; not a management problem at all. \"Please\", \"Thank you\", \"Ma'am\". Pt spent most of the shift in her bed, lying down quietly or sleeping. States she's having a better day. Attended recreational group and sat in the dayroom singing and listening to music for quite some time;seemed to uplift her spirits. Pt ate most of her dinner meal, but was asleep for snack. Pt did not have visitors this shift, but did have a phone call that seemed to upset her. Observed Pt after the phone call and Pt used coping skills to regroup. Stated to Pt that we're proud of her for using coping skills and not escalating a tense situation. Pt smiled and said , \"thank you\". Pt utilizes non-skid footwear and is free from falls. Pt denies S/I,H/I,A/H and V/H. Pt contracts for safety on the unit.

## 2017-05-01 LAB — LITHIUM SERPL-SCNC: 0.8 MMOL/L (ref 0.6–1.2)

## 2017-05-01 PROCEDURE — 74011250637 HC RX REV CODE- 250/637: Performed by: PSYCHIATRY & NEUROLOGY

## 2017-05-01 PROCEDURE — 65220000003 HC RM SEMIPRIVATE PSYCH

## 2017-05-01 PROCEDURE — 36415 COLL VENOUS BLD VENIPUNCTURE: CPT | Performed by: PSYCHIATRY & NEUROLOGY

## 2017-05-01 PROCEDURE — 80178 ASSAY OF LITHIUM: CPT | Performed by: PSYCHIATRY & NEUROLOGY

## 2017-05-01 RX ORDER — OLANZAPINE 5 MG/1
20 TABLET, ORALLY DISINTEGRATING ORAL
Status: DISCONTINUED | OUTPATIENT
Start: 2017-05-01 | End: 2017-05-08 | Stop reason: HOSPADM

## 2017-05-01 RX ORDER — CLONAZEPAM 0.5 MG/1
0.5 TABLET ORAL 2 TIMES DAILY
Status: DISCONTINUED | OUTPATIENT
Start: 2017-05-01 | End: 2017-05-08 | Stop reason: HOSPADM

## 2017-05-01 RX ADMIN — LITHIUM CARBONATE 300 MG: 300 TABLET ORAL at 13:39

## 2017-05-01 RX ADMIN — OLANZAPINE 5 MG: 5 TABLET, ORALLY DISINTEGRATING ORAL at 08:13

## 2017-05-01 RX ADMIN — CLONAZEPAM 0.5 MG: 0.5 TABLET ORAL at 08:13

## 2017-05-01 RX ADMIN — OLANZAPINE 20 MG: 5 TABLET, ORALLY DISINTEGRATING ORAL at 21:11

## 2017-05-01 RX ADMIN — PROPRANOLOL HYDROCHLORIDE 20 MG: 10 TABLET ORAL at 21:11

## 2017-05-01 RX ADMIN — LORAZEPAM 2 MG: 1 TABLET ORAL at 17:39

## 2017-05-01 RX ADMIN — PROPRANOLOL HYDROCHLORIDE 20 MG: 10 TABLET ORAL at 08:13

## 2017-05-01 RX ADMIN — LITHIUM CARBONATE 300 MG: 300 TABLET ORAL at 08:13

## 2017-05-01 RX ADMIN — LITHIUM CARBONATE 300 MG: 300 TABLET ORAL at 21:11

## 2017-05-01 RX ADMIN — CLONAZEPAM 0.5 MG: 0.5 TABLET ORAL at 21:05

## 2017-05-01 NOTE — BH NOTES
SW Contact: Pt.is a 23year old female with history of Schizoaffective disorder Bipolar type. Pt. Was admitted to this facility for being a danger to self. Pt. Per mother was jumping out of the car banging on the lim of a car and walking in and out of traffic. SW attempted to meet with pt to discuss d/c planning. Pt.stated she was feeling better today. SW discussed eth reason for this admission. Pt admits she was having sleep disturbance and hallucinations. Pt. also admits she has not been medication and treatment complaint in the community. SW discussed the importance of treatment and medication compliance. Pt admits to self medicating with marijuana. Pt stated she smokes 5 blunts a day. SW provided pt with mental health and substance abuse education. Pt. Will be referred to out pt mental health and SA services upon d.c pt. Plans to return to her home with grandmother upon d/c.

## 2017-05-01 NOTE — BH NOTES
When this writer arrived on the unit the pt was sitting in day area. Pt has been pleasant with peers and staff,pt said she is ready to go home. Pt ate her snack and watched T.V.snd took her night time medication. Pt had on skid proof socks to prevent falling will continue to monitor per protocol.

## 2017-05-01 NOTE — PROGRESS NOTES
Behavioral Health Progress Note      5/1/2017    Garry Bobo    Current Diagnosis:  Axis I:Schizoaffective Disorder, Bipolar type  THC Use Disorder, continuous  Axis II:Deferred  Axis III:Tachycardia  Axis IV:Relatively homeless, fixed income, unemployed, poor social supports, chronic drug use  Axis V:45      Report from the nursing staff changes in the medical condition while patient has been on the unit:  See notes from today. Patient Vitals for the past 24 hrs:   Temp Pulse Resp BP   05/01/17 0745 97.2 °F (36.2 °C) 91 20 (!) 138/92   04/30/17 2013 99.2 °F (37.3 °C) 91 18 (!) 134/93       Recent Results (from the past 24 hour(s))   LITHIUM    Collection Time: 05/01/17  6:45 AM   Result Value Ref Range    Lithium 0.80 0.6 - 1.2 MMOL/L       Sleep:has evidence of insomnia    Intake: Poor    Patient Comments:Some improvements noted today. Calm, co-operative, but it appears this is for my benefit as she is focused on discharge. See notes from today, said to still be paranoid, and suspicious. Med compliant. Oriented X4. Mental Status Exam:  Appearance:is with improved hygiene and is no longer bizarre in action or apperance  Behavior: controlled  Motor: Euthymic  Speech: low voiced  Mood: Less irritable and labile  Affect: Mood congruent, flat  Thought Process: Logical, coherent, goal-directed, nodelusional.  Thought Content: paranoia  Perception:None  Cognition: stable attention/concentration, not with impaired decision making and impulsive  Insight: The patient shows little insight  Judgment: is improving and does not seem cognitively impaired  Medications:      Treatment Plan:  Taper off Klonopin; Lithium level is 0.8; get lipid panel, and A1c; have a family meeting tomorrow. Anticipated Discharge:  Possibly Wednesday.     Danay Medina MD  5/1/2017

## 2017-05-01 NOTE — BH NOTES
Psychiatry progress note     Chart reviewed, patient interviewed. Patient stated she is feeling fine and was focused on discharge. Says she feels calmer and has noticed a positive different on her current meds. Denied any concerns. When asked about her meds, patient states she understands they are something she has to take.     On exam, calmer. Paranoid. No SI, HI or AVH. Insight and judgment fair.     Meds are Klonopin 0.5 qDay and 1 qHS; Lithium 300 TID, Zydis 15 qHS and 5 qDay.     Assessment - Schizoaffective Disorder, Bipolar type; THC Use Disorder, continuous. Significantly improved today with improved affect/mood. Plan is to check Li level in AM and continue current treatment plan.

## 2017-05-01 NOTE — BH NOTES
GROUP THERAPY PROGRESS NOTE    Luke John is participating in [place].      Group time: 30 minutes    Personal goal for participation: discuss guideline compliance, unit issues and community announcements; discuss daily Tx goal(s)             Goal orientation: community    Group therapy participation: active

## 2017-05-01 NOTE — PROGRESS NOTES
Patient approached nurse station and requested medication for to \"help me relax! \" when asked about the cause of anxiety or need for medication to help her relax; patient stated that she knows what causes anxiety, but she does not want to talk about; patient given Ativan 2 mg po for anxiety; will offer 1:1 as needed, monitor, and provide safe environment. 18:35 pm  Patient is resting quietly in bed; verbalized that medication relieved anxiety.

## 2017-05-01 NOTE — BSMART NOTE
OCCUPATIONAL THERAPY PROGRESS NOTE    Group Time:  0295  Attendance: The patient attended 1/4 of group. Participation:  The patient participated with moderate elaboration in the activity. Attention:  The patient needed redirection to activity at least once. .  Interaction:  The patient frequently interacts with others. Left group, observed talking and laughing with peers.

## 2017-05-01 NOTE — PROGRESS NOTES
Problem: Falls - Risk of  Goal: *Absence of falls  Patient will remain free of falls daily during hospital stay. Outcome: Progressing Towards Goal  No falls     Problem: Psychosis  Goal: *STG: Participates in individual and group therapy  Patient will attend and participate in 2-3 groups daily while in the hospital.   Outcome: Progressing Towards Goal  Attending groups   Goal: *STG/LTG: Complies with medication therapy  Patient will comply with medication daily during hospital.   Outcome: Progressing Towards Goal  Compliant     Problem: Psychosis  Goal: *STG: Decreased delusional thinking  Patient will demonstrate improved thought processes daily during hospital stay. Outcome: Progressing Towards Goal  Variance: Patient slowly responding  Comments: Paranoid but improved     Comments:   Pt is calm today sitting in the day area. She has been compliant with medications and is attending groups. Pt is guarded and suspicious not interested in one to one. She is focused on when the doctor will be here. She remains somewhat angry at times but is mostly cooperative. Pt states she is angry because she does not like people. Pt came to this nurse and asked her to shred the NIDIA she signed for her cousin because she no longer wants us to talk to her cousin. She asked if she could read the NIDIA which she did and then told this nurse to shred it which was done.

## 2017-05-01 NOTE — BH NOTES
GROUP THERAPY PROGRESS NOTE    Grace Marie is participating in Recreational Therapy. Group time: 35 minutes    Personal goal for participation:  Exercise  And fresh air.     Goal orientation: relaxation    Group therapy participation: active    Therapeutic interventions reviewed and discussed:Patient  Played basketball    Impression of participation: calm

## 2017-05-02 LAB
CHOLEST SERPL-MCNC: 155 MG/DL
HBA1C MFR BLD: 5.5 % (ref 4.2–5.6)
HDLC SERPL-MCNC: 53 MG/DL (ref 40–60)
HDLC SERPL: 2.9 {RATIO} (ref 0–5)
LDLC SERPL CALC-MCNC: 83.4 MG/DL (ref 0–100)
LIPID PROFILE,FLP: NORMAL
TRIGL SERPL-MCNC: 93 MG/DL (ref ?–150)
VLDLC SERPL CALC-MCNC: 18.6 MG/DL

## 2017-05-02 PROCEDURE — 83036 HEMOGLOBIN GLYCOSYLATED A1C: CPT | Performed by: PSYCHIATRY & NEUROLOGY

## 2017-05-02 PROCEDURE — 65220000003 HC RM SEMIPRIVATE PSYCH

## 2017-05-02 PROCEDURE — 36415 COLL VENOUS BLD VENIPUNCTURE: CPT | Performed by: PSYCHIATRY & NEUROLOGY

## 2017-05-02 PROCEDURE — 74011250637 HC RX REV CODE- 250/637: Performed by: PSYCHIATRY & NEUROLOGY

## 2017-05-02 PROCEDURE — 80061 LIPID PANEL: CPT | Performed by: PSYCHIATRY & NEUROLOGY

## 2017-05-02 RX ORDER — LITHIUM CARBONATE 300 MG
300 TABLET ORAL 2 TIMES DAILY
Status: DISCONTINUED | OUTPATIENT
Start: 2017-05-02 | End: 2017-05-08 | Stop reason: HOSPADM

## 2017-05-02 RX ORDER — LITHIUM CARBONATE 300 MG
600 TABLET ORAL
Status: DISCONTINUED | OUTPATIENT
Start: 2017-05-02 | End: 2017-05-08 | Stop reason: HOSPADM

## 2017-05-02 RX ADMIN — OLANZAPINE 20 MG: 5 TABLET, ORALLY DISINTEGRATING ORAL at 20:34

## 2017-05-02 RX ADMIN — CLONAZEPAM 0.5 MG: 0.5 TABLET ORAL at 20:34

## 2017-05-02 RX ADMIN — LITHIUM CARBONATE 300 MG: 300 TABLET ORAL at 08:34

## 2017-05-02 RX ADMIN — LITHIUM CARBONATE 600 MG: 300 TABLET ORAL at 20:34

## 2017-05-02 RX ADMIN — OLANZAPINE 5 MG: 5 TABLET, ORALLY DISINTEGRATING ORAL at 08:33

## 2017-05-02 RX ADMIN — PROPRANOLOL HYDROCHLORIDE 20 MG: 10 TABLET ORAL at 08:34

## 2017-05-02 RX ADMIN — LITHIUM CARBONATE 300 MG: 300 TABLET ORAL at 14:06

## 2017-05-02 RX ADMIN — PROPRANOLOL HYDROCHLORIDE 20 MG: 10 TABLET ORAL at 20:34

## 2017-05-02 RX ADMIN — CLONAZEPAM 0.5 MG: 0.5 TABLET ORAL at 08:33

## 2017-05-02 NOTE — BSMART NOTE
OCCUPATIONAL THERAPY PROGRESS NOTE  Group Time:  1672  Attendance: The patient attended full group. The patient left and returned to activity at least once. Participation:  The patient participated fully in the activity. Attention:  The patient needed redirection to activity at least once. Interaction:  The patient frequently interacts with others. Stated her goal was to improve her relationship with her mother, discussed how she felt bad about things she said/did before admission; reminded that illness can affect behavior and suggested patient write what she wanted to say to mother.

## 2017-05-02 NOTE — BH NOTES
Patient participated in group today , she walked away from recreational group before notifying staff , staff told her to  Communicate with staff and to always stay with the group, staff will continue monitor her for safety,

## 2017-05-02 NOTE — PROGRESS NOTES
Problem: Falls - Risk of  Goal: *Absence of falls  Patient will remain free of falls daily during hospital stay. Outcome: Progressing Towards Goal  Been no falls. Problem: Psychosis  Goal: *STG/LTG: Complies with medication therapy  Patient will comply with medication daily during hospital.   Outcome: Progressing Towards Goal  Med compliant. Problem: Psychosis  Goal: *STG: Decreased delusional thinking  Patient will demonstrate improved thought processes daily during hospital stay. Outcome: Progressing Towards Goal  Denies delusions. Comments:   Pt was cooperative 1:1, she was sitting in the day area at the time. Been back and forth in the milieu. Stated feeling good \" I'm ready to get out \". Denied hallucinations depression and ideations, stated she was endorsing voices before coming to the hospital, \" I stopped taking my medicine \". Med and diet compliant. Behavior been calm so far today.

## 2017-05-02 NOTE — BH NOTES
SW discussed case with the treating psychiatrist. Pt will have a family phone session on 5/3/17. MIKE Contact: Pt.is a 23year old female with history of Schizoaffective disorder Bipolar type. Pt. Was admitted to this facility for being a danger to self. Pt. Per mother was jumping out of the car banging on the lim of a car and walking in and out of traffic. SW attempted to meet with pt to discuss d/c planning. Pt.stated she was feeling bored today. SW engaged pt with positive goal setting. SW discussed positive coping skills, positive conflict resolution and continued medication /treatment compliance in patient as well as outpt pt. Pt. Stated she does not have any issues with her grandmother. Pt. Expressed having conflict with her mother. SW engaged pt with the empty chair. Pt stated she wants her mother to be there for her when she is doing good not only when she is bad. SW discussed self-efficacy and making positive choices. SW also addressed pt.'s marijuana use. SW encouraged pt to refrain from uses substances because it is counter productive. Pt.is currently denying ideations and hallucinations. Pt. Has a family phone session scheduled for 5/3/17 via phone. Pt started to whine about having to wait longer to be d/c.  SW encouraged positive coping skills .

## 2017-05-02 NOTE — BH NOTES
GROUP THERAPY PROGRESS NOTE    Mary Kincaid is participating in Recreational Therapy. Group time: 20 minutes    Personal goal for participation: Relaxation    Goal orientation: relaxation    Group therapy participation: active    Therapeutic interventions reviewed and discussed: Relaxation/Exercise    Impression of participation: Pt sat outside and relaxed in the sun; appeared to enjoy socializing with peers and staff.

## 2017-05-02 NOTE — PROGRESS NOTES
Behavioral Health Progress Note      5/2/2017    Larry Jackson    Current Diagnosis:  Axis I:Schizoaffective Disorder, Bipolar type  THC Use Disorder, continuous  Axis II:Deferred  Axis III:Tachycardia  Axis IV:Relatively homeless, fixed income, unemployed, poor social supports, chronic drug use  Axis V:45      Report from the nursing staff changes in the medical condition while patient has been on the unit:  See notes from today. Patient Vitals for the past 24 hrs:   Temp Pulse Resp BP   05/02/17 0800 98.4 °F (36.9 °C) 93 16 127/85   05/01/17 2037 97.4 °F (36.3 °C) 100 18 -       Recent Results (from the past 24 hour(s))   LIPID PANEL    Collection Time: 05/02/17  6:20 AM   Result Value Ref Range    LIPID PROFILE          Cholesterol, total 155 <200 MG/DL    Triglyceride 93 <150 MG/DL    HDL Cholesterol 53 40 - 60 MG/DL    LDL, calculated 83.4 0 - 100 MG/DL    VLDL, calculated 18.6 MG/DL    CHOL/HDL Ratio 2.9 0 - 5.0     HEMOGLOBIN A1C W/O EAG    Collection Time: 05/02/17  6:20 AM   Result Value Ref Range    Hemoglobin A1c 5.5 4.2 - 5.6 %       Sleep:has evidence of insomnia    Intake: Poor    Patient Comments: Some improvements noted today. She continues to present and remain calm, co-operative, but it appears this is for my benefit as she is focused on discharge. See notes from today, said to still be paranoid, and suspicious. Med compliant. Oriented X4. No major manic behaviors. No loss of control in last 24 hours. Lithium level was drawn early and is 0.80 on dose of 300 mg TID.     Mental Status Exam:  Appearance:is with improved hygiene and is no longer bizarre in action or apperance  Behavior: controlled  Motor: Euthymic  Speech: low voiced  Mood: Less irritable and labile  Affect: Mood congruent, flat  Thought Process: Logical, coherent, goal-directed, nodelusional.  Thought Content: paranoia  Perception:None  Cognition: stable attention/concentration, not with impaired decision making and impulsive  Insight: The patient shows little insight  Judgment: is improving and does not seem cognitively impaired  Medications:      Treatment Plan:  Frankly she would greatly benefit from [place] [facility]; but with current abuse of recreational drugs, and limited insight, this medication will have ot be given judiciously, possibly by her GrandMother. I will ask of this in meeting tomorrow,for now, hold off Klonopin taper. Lithium level is 0.8; we got a lipid panel, and A1c, all look okay today; have a family meeting tomorrow, likely on the phone first, and then a [id number] visit on thrusday.     Anticipated Discharge:  Possibly end of week Friday    Chaitanya Winkler MD  5/2/2017

## 2017-05-03 PROCEDURE — 74011250637 HC RX REV CODE- 250/637: Performed by: PSYCHIATRY & NEUROLOGY

## 2017-05-03 PROCEDURE — 65220000003 HC RM SEMIPRIVATE PSYCH

## 2017-05-03 RX ORDER — HYDROXYZINE PAMOATE 25 MG/1
25 CAPSULE ORAL
Status: DISCONTINUED | OUTPATIENT
Start: 2017-05-03 | End: 2017-05-08 | Stop reason: HOSPADM

## 2017-05-03 RX ADMIN — OLANZAPINE 5 MG: 5 TABLET, ORALLY DISINTEGRATING ORAL at 14:06

## 2017-05-03 RX ADMIN — LITHIUM CARBONATE 300 MG: 300 TABLET ORAL at 08:07

## 2017-05-03 RX ADMIN — LITHIUM CARBONATE 600 MG: 300 TABLET ORAL at 20:09

## 2017-05-03 RX ADMIN — CLONAZEPAM 0.5 MG: 0.5 TABLET ORAL at 20:09

## 2017-05-03 RX ADMIN — PROPRANOLOL HYDROCHLORIDE 20 MG: 10 TABLET ORAL at 08:05

## 2017-05-03 RX ADMIN — PROPRANOLOL HYDROCHLORIDE 20 MG: 10 TABLET ORAL at 20:10

## 2017-05-03 RX ADMIN — LITHIUM CARBONATE 300 MG: 300 TABLET ORAL at 14:06

## 2017-05-03 RX ADMIN — CLONAZEPAM 0.5 MG: 0.5 TABLET ORAL at 08:07

## 2017-05-03 RX ADMIN — OLANZAPINE 20 MG: 5 TABLET, ORALLY DISINTEGRATING ORAL at 20:09

## 2017-05-03 RX ADMIN — LORAZEPAM 1 MG: 1 TABLET ORAL at 01:51

## 2017-05-03 NOTE — BSMART NOTE
ACTIVITIES THERAPY PROGRESS NOTE    Group time:1120    The patient did not refuse to recorder personally, but, did not come to group.

## 2017-05-03 NOTE — BH NOTES
Pt is a lot calmer this shift. Pt has not needed redirection; not a management problem. Pt has been very helpful and gave compliments to those who assisted her this evening. Pt spent most of the shift, in the day area, playing Spades. Pt stated that she spoke to her physician and she should be discharging on Thursday. Encouraged Pt to continue to keep up the positive attitude and follow the advised treatment plan. Pt smiled and stated that she will and she feels much better after taking her medications, like she should. Pt attended both groups. Pt did not have visitors this shift, but was observed on a the phone a couple of times. Pt ate most of her dinner meal and snack. Pt is wearing non-skid footwear and is free from falls. Pt denies S/I, H/I, A/H, and V/H. Pt contract for safety on the unit.

## 2017-05-03 NOTE — BH NOTES
Patient up at the nurse's station reporting restlessness. Patient stated that she needed something to relax her. PRN medication provided.

## 2017-05-03 NOTE — BH NOTES
GROUP THERAPY PROGRESS NOTE    Edison Stauffer is participating in [place]. Group time: 15 minutes    Personal goal for participation: Community Announcements,Repairs,Community Concerns       Goal orientation: community    Group therapy participation: active    Therapeutic interventions reviewed and discussed: Community Announcements,Repairs,Community Concerns       Impression of participation: Pt stated that her restroom needs to be cleaned tomorrow, when feasible.  Informed her to let staff and/housekeeping know tomorrow; whomever she sees first.

## 2017-05-03 NOTE — PROGRESS NOTES
Problem: Falls - Risk of  Goal: *Absence of falls  Patient will remain free of falls daily during hospital stay. Outcome: Progressing Towards Goal  Remains free from falls    Problem: Psychosis  Goal: *STG: Participates in individual and group therapy  Patient will attend and participate in 2-3 groups daily while in the hospital.   Outcome: Progressing Towards Goal  Participates in group  Goal: *STG/LTG: Complies with medication therapy  Patient will comply with medication daily during hospital.   Outcome: Progressing Towards Goal  compliant    Comments:   Pt has been up in milieu participating in group. Pt denies hallucinations, suicidal/ homicidal ideations. Pt verbalized understanding of medication compliance and using coping medications to stay on task.

## 2017-05-03 NOTE — BH NOTES
SW discussed case with the treating psychiatrist. Pt. will have a family session on 5/4/17 to include her mother. MIKE Contact: Pt.is a 23year old female with history of Schizoaffective disorder Bipolar type. Pt. Was admitted to this facility for being a danger to self. Pt. Per mother was jumping out of the car banging on the lim of a car and walking in and out of traffic. SW met with pt to discuss d/c planning. Pt. stated she was feeling better today. Pt stated she had met with the treating psychaitrts this a.m . pt expressed she was feeling much better. SW provided pt. with mental helathe ducation /discussed medication complance oppose to self medicating withmarjunia. SW provided pt with education on the legal susbatcne and how it can continue to impair her ability to make right choice . SW also informed pt hopw the drug could caused increase halluincatios that led tomental health decompenstaion. SW discussed safety plan, [facility], positive coping skills, positive conflict resolution and continued medication /treatment compliance. Pt. is denying ideations and hallucinations. Pt. Has a family phone session scheduled for 5/4/17 . Pt. started to whine about having to wait longer to be d/c.

## 2017-05-03 NOTE — PROGRESS NOTES
Behavioral Health Progress Note      5/3/2017    Jose Herrera    Current Diagnosis:  Axis I:Schizoaffective Disorder, Bipolar type  THC Use Disorder, continuous  Axis II:Deferred  Axis III:Tachycardia  Axis IV:Relatively homeless, fixed income, unemployed, poor social supports, chronic drug use  Axis V:45      Report from the nursing staff changes in the medical condition while patient has been on the unit:  See notes from today. Patient Vitals for the past 24 hrs:   Temp Pulse Resp BP   05/03/17 1000 97 °F (36.1 °C) 98 16 137/86   05/02/17 1921 97.6 °F (36.4 °C) 89 17 130/82       No results found for this or any previous visit (from the past 24 hour(s)). Sleep:has evidence of insomnia    Intake: Poor    Patient Comments: Calm and co-operative. Lucid. Family meetign moved till tomorrow because Mom cant come today. New lithium level needed for Friday Morning. Denies SI/HI, no AH/VH. Med compliant. Oriented X4. No major manic behaviors. Mental Status Exam:  Appearance:is with improved hygiene and is no longer bizarre in action or apperance  Behavior: controlled  Motor: Euthymic  Speech: low voiced  Mood: Less irritable and labile  Affect: Mood congruent, flat  Thought Process: Logical, coherent, goal-directed, nodelusional.  Thought Content: paranoia  Perception:None  Cognition: stable attention/concentration, not with impaired decision making and impulsive  Insight: The patient shows little insight  Judgment: is improving and does not seem cognitively impaired  Medications:      Treatment Plan:  Frankly she would greatly benefit from Klonopin; but with current abuse of recreational drugs, and limited insight, this medication will have to be given judiciously, possibly by her GrandMother. I will ask of this in meeting tomorrow,for now, hold off Klonopin taper.      Lithium level is 0.8; we got a lipid panel, and A1c, all look okay today; have a family meeting tomorrow, likely on the phone first, and then a [id number] visit on thrusday. Continue current treatment plan.     Anticipated Discharge:  Possibly end of week Friday    Dylon Payne MD  5/3/2017

## 2017-05-03 NOTE — BH NOTES
GROUP THERAPY PROGRESS NOTE    Vicente Mcgraw is participating in Recreational Therapy. Group time: 30 minutes    Personal goal for participation: Relaxation,Social    Goal orientation: social    Group therapy participation: active    Therapeutic interventions reviewed and discussed: Relaxation,Social    Impression of participation: Pt chose to play basketball and watch television with peers. Pt was social with peers and staff. Pt appeared to be in good spirits; smiling and laughing.

## 2017-05-03 NOTE — PROGRESS NOTES
NUTRITION    Nutrition Screen      RECOMMENDATIONS / PLAN:     - Continue current nutrition interventions. - Continue RN inpatient monitoring and evaluation. NUTRITION DIAGNOSIS & INTERVENTIONS:     [x] Meals/Snacks: general/healthful diet  [x] Nutrition education/counseling: healthy eating, weight management diet education 5/3/17     Nutrition Diagnosis:  Excessive energy intake related to increased hunger associated with marijuana use as evidenced by patient admits to eating all throughout the day and consuming high calorie/high fat foods. ASSESSMENT:     Subjective/Objective:  Patient tolerating diet with adequate intake of meals. Admits to overeating since living with her grandmother and has gained weight over the past several months. She states her increased appetite resulted from her marijuana use, but that she is planning to stop and follow healthy diet in hopes of loosing weight. Provided diet education. Average intake adequate to meet patients estimated nutritional needs:   [x] Yes     [] No    Diet: DIET REGULAR    Food Allergies: NKFA  Chewing/Swallowing Difficulty:  [x] None known     [] Yes:   Current Appetite:   [x] Good     [] Fair     [] Poor     [] Other:  Appetite/meal intake prior to admission:   [x] Good     [] Fair     [] Poor     [] Other:  Current Meal Intake: No data found. Gastrointestinal Issues:  [] Yes    [x] No   Skin Integrity:  WDL    Pertinent Medications:  Reviewed   Labs:  Reviewed     Anthropometrics:  Ht Readings from Last 1 Encounters:   05/03/17 5' 5\" (1.651 m)       Last 3 Recorded Weights in this Encounter    05/03/17 1623   Weight: 72.6 kg (160 lb)       Body mass index is 26.63 kg/(m^2).   Overweight     Weight History: patient reports previous weight of 145 lb several months PTA and weight gain up to current weight of 160 lb (15 lb, 9% weight gain)    Weight Metrics 5/3/2017 4/26/2017 4/26/2017 4/26/2017 11/15/2016 3/16/2016   Weight 160 lb - 185 lb - 140 lb 125 lb   BMI - 26.63 kg/m2 - 30.79 kg/m2 24.8 kg/m2 20.8 kg/m2       Admitting Diagnosis: Bipolar 1 Disorder  Bipolar 1 disorder (HCC)  Past Medical History:   Diagnosis Date    Aggressive outburst     Anxiety disorder     Bipolar 1 disorder ([facility]     Depression     Depression     Diabetes ([facility]     Suicidal thoughts         Education Needs:        [] None identified  [] Identified - Not appropriate at this time  [x]  Identified and addressed - refer to education log  Learning Limitations:   [x] None identified  [] Identified    Cultural, Sikhism & ethnic food preferences identified:  [x] None    [] Yes      ESTIMATED NUTRITION NEEDS:     8407-0725 kcal (MSJx1.2-1.3), 222-241 gm CHO (50% kcal), 58-73 gm protein (0.8-1 gm/kg), 1 mL/kcal  Based on: 73 kg       [x] Actual BW      [] IBW          MONITORING & EVALUATION:     Nutrition Goal(s):   1. Po intake of meals will meet >75% of patient estimated nutritional needs within the next 7 days.   Outcome:   [] Met    []  Not Met   [x] New/Initial Goal    Monitor:  [x] Meal intake    [x] Diet tolerance    [] Supplement intake    Previous Recommendations (for follow-up assessments only):     []   Implemented       []   Not Implemented (RD to address)         Discharge Planning: diabetic diet   [x]  Participated in care planning, discharge planning, & interdisciplinary rounds as appropriate      Shana Hartley, 64 [address] [place] [address]    Pager: [phone]

## 2017-05-03 NOTE — BH NOTES
GROUP THERAPY PROGRESS NOTE    Asha Hughes is participating in [place].      Group time: 30 minutes    Personal goal for participation: have good day    Goal orientation: community    Group therapy participation: minimal    Therapeutic interventions reviewed and discussed: goals and procedures    Impression of participation: encouraged

## 2017-05-03 NOTE — BSMART NOTE
ART THERAPY GROUP PROGRESS NOTE    Group time:1415    The patient refused group despite encouragement.

## 2017-05-04 PROCEDURE — 74011250637 HC RX REV CODE- 250/637: Performed by: PSYCHIATRY & NEUROLOGY

## 2017-05-04 PROCEDURE — 65220000003 HC RM SEMIPRIVATE PSYCH

## 2017-05-04 RX ADMIN — CLONAZEPAM 0.5 MG: 0.5 TABLET ORAL at 20:27

## 2017-05-04 RX ADMIN — OLANZAPINE 20 MG: 5 TABLET, ORALLY DISINTEGRATING ORAL at 20:27

## 2017-05-04 RX ADMIN — OLANZAPINE 5 MG: 5 TABLET, ORALLY DISINTEGRATING ORAL at 09:00

## 2017-05-04 RX ADMIN — LITHIUM CARBONATE 300 MG: 300 TABLET ORAL at 13:48

## 2017-05-04 RX ADMIN — PROPRANOLOL HYDROCHLORIDE 20 MG: 10 TABLET ORAL at 20:27

## 2017-05-04 RX ADMIN — CLONAZEPAM 0.5 MG: 0.5 TABLET ORAL at 08:23

## 2017-05-04 RX ADMIN — LITHIUM CARBONATE 300 MG: 300 TABLET ORAL at 08:23

## 2017-05-04 RX ADMIN — PROPRANOLOL HYDROCHLORIDE 20 MG: 10 TABLET ORAL at 08:23

## 2017-05-04 RX ADMIN — LITHIUM CARBONATE 600 MG: 300 TABLET ORAL at 20:27

## 2017-05-04 NOTE — BH NOTES
Pt has been interacting with peers and staff this shift; states she's feeling much better than when she was admitted. Pt has been in the day area most of the shift, playing [employer] and Spades with peers and staff. Handles situations with coping skills, rather than lashing out. Pt has been polite, calm, and cooperative; has not been a management problem. States she is discharging on Friday and looking forward to applying coping skills in stressful situations. Pt anxious about her family meeting tomorrow due to the fact that her mother will be there. Pt states she gets along with her grandmother, but her mother aggravates her and she will have to \"bite her tongue\" during the session to keep from getting upset. Pt encouraged to use the communication skills that she has learned to build a better relationship with her mother. Pt did not have visitors this shift. Pt attended group therapy this shift. Pt ate most of her dinner meal and 100% of her snack. Pt is wearing non-skid footwear and is free from from falls. Pt denies S/I, H/I, A/H, and V/H. Pt contracts for safety on the unit. Will continue to monitor.

## 2017-05-04 NOTE — BH NOTES
Patient present in day room throughout this shift, social with peers and participated in milieu activities. Patient had a family session and was positive about that session but did not expand on issues addressed with family. Patient showered in the evening, made phone calls. Patient spent time coloring in day room and watched a movie with peers in the evening. Will continue to monitor per safety precautions.

## 2017-05-04 NOTE — BSMART NOTE
SW FAMILY THERAPY SESSION: The SW met with the patient, her mother and grandmother to discuss reason for admission, concerns, needs, progress and aftercare plans. The family greeted each other warmly and presented as mutually supportive and amenable. The patient expressed that she is having difficulty coping with the guilt and shame she feels regarding her behaviors and was unsure of her family could forgive and continue to support her. She expressed feeling depressed about it; the family told the patient that they understand that she was ill and that they love and forgive her. The patient's mother disclosed that she suffers from depression and takes medications as well but never told anyone including her spouse. The patient stated Moses Quale would you not tell me when you know I have been having a hard time? \" The parent expressed embarrassment and being fearful that people would  her. The patient disclosed that its the reason why she did not want others to know she had been diagnosed with Bipolar Disorder. The family agreed to support the patient and provide any assistance needed but they just needed her to be healthy. The SW discussed the symptoms of depression and edward and encouraged the patient to chart her moods and behaviors on a calendar daily so that patterns could be identified that could aid in improving her treatment. The SW discussed the importance of having a consistent support system, compliance with her prescribed medication regime, self-care, healthy thinking, emotion regulation and coping strategies.

## 2017-05-04 NOTE — BH NOTES
GROUP THERAPY PROGRESS NOTE    Lucina Haines is not participating in [place].      Group time: 30 minutes    Personal goal for participation: none    Goal orientation: community    Group therapy participation: refused    Therapeutic interventions reviewed and discussed: goals and procedures    Impression of participation: encouraged

## 2017-05-04 NOTE — PROGRESS NOTES
Problem: Psychosis  Goal: *STG: Participates in individual and group therapy  Patient will attend and participate in 2-3 groups daily while in the hospital.   Outcome: Not Progressing Towards Goal  Pt does not attend groups and activities with staff encouragement  Goal: *STG/LTG: Complies with medication therapy  Patient will comply with medication daily during hospital.   Outcome: Progressing Towards Goal  Pt compliant with medications    Problem: Psychosis  Goal: *STG/LTG: Demonstrates improved thought patterns as evidenced by logical and coherent speech  Outcome: Progressing Towards Goal  Pt demonstrated improved thought patterns as evidenced by logical and coherent speech    Comments:   Pt cooperative, calm with less bizarre behavior observed. Compliant with medications but does not attend groups and activities with staff encouragement. Visible in milieu socializing with peers. Another peer came to desk stated patient let him get out of his chair. Pt denies feelings and thoughts of self harm or harm to others, denies hallucinations. Staff continue to monitor health and safety and provide support as needed with therapeutic regimen.

## 2017-05-04 NOTE — PROGRESS NOTES
Behavioral Health Progress Note      5/4/2017    Larry Jackson    Current Diagnosis:  Axis I:Schizoaffective Disorder, Bipolar type  THC Use Disorder, continuous  Axis II:Deferred  Axis III:Tachycardia  Axis IV:Relatively homeless, fixed income, unemployed, poor social supports, chronic drug use  Axis V:45      Report from the nursing staff changes in the medical condition while patient has been on the unit:  See notes from today. Patient Vitals for the past 24 hrs:   Temp Pulse Resp BP   05/04/17 0800 97.5 °F (36.4 °C) (!) 103 18 130/72   05/03/17 1900 98.5 °F (36.9 °C) 90 18 121/82       No results found for this or any previous visit (from the past 24 hour(s)). Sleep:has evidence of insomnia    Intake: Poor    Patient Comments: Calm and co-operative. Lucid. Family meeting moved till today and her Mom will be there. Anxious about meeting. New lithium level needed for Friday Morning. Denies SI/HI, no AH/VH. Med compliant. Oriented X4. No major manic behaviors. Mental Status Exam:  Appearance:is with improved hygiene and is no longer bizarre in action or apperance  Behavior: controlled  Motor: Euthymic  Speech: low voiced  Mood: Less irritable and labile  Affect: Mood congruent, flat  Thought Process: Logical, coherent, goal-directed, nodelusional.  Thought Content: paranoia  Perception:None  Cognition: stable attention/concentration, not with impaired decision making and impulsive  Insight: The patient shows little insight  Judgment: is improving and does not seem cognitively impaired  Medications:      Treatment Plan:  Frankly she would greatly benefit from Klonopin; but with current abuse of recreational drugs, and limited insight, this medication will have to be given judiciously, possibly by her GrandMother. I will ask of this in meeting tomorrow,for now, hold off Klonopin taper.      Lithium level is 0.8; we got a lipid panel, and A1c, all look okay today; have a family meeting today, likely on the phone first, and then a [id number] visit on thrusday. Continue current treatment plan.     Anticipated Discharge:  Possibly end of week Friday    Dylon Payne MD  5/4/2017

## 2017-05-05 PROCEDURE — 65220000003 HC RM SEMIPRIVATE PSYCH

## 2017-05-05 PROCEDURE — 74011250637 HC RX REV CODE- 250/637: Performed by: PSYCHIATRY & NEUROLOGY

## 2017-05-05 RX ADMIN — PROPRANOLOL HYDROCHLORIDE 20 MG: 10 TABLET ORAL at 08:15

## 2017-05-05 RX ADMIN — CLONAZEPAM 0.5 MG: 0.5 TABLET ORAL at 20:46

## 2017-05-05 RX ADMIN — OLANZAPINE 5 MG: 5 TABLET, ORALLY DISINTEGRATING ORAL at 08:16

## 2017-05-05 RX ADMIN — LITHIUM CARBONATE 600 MG: 300 TABLET ORAL at 20:46

## 2017-05-05 RX ADMIN — PROPRANOLOL HYDROCHLORIDE 20 MG: 10 TABLET ORAL at 20:46

## 2017-05-05 RX ADMIN — LITHIUM CARBONATE 300 MG: 300 TABLET ORAL at 14:17

## 2017-05-05 RX ADMIN — LITHIUM CARBONATE 300 MG: 300 TABLET ORAL at 08:16

## 2017-05-05 RX ADMIN — HYDROXYZINE PAMOATE 25 MG: 25 CAPSULE ORAL at 00:02

## 2017-05-05 RX ADMIN — CLONAZEPAM 0.5 MG: 0.5 TABLET ORAL at 08:15

## 2017-05-05 RX ADMIN — OLANZAPINE 20 MG: 5 TABLET, ORALLY DISINTEGRATING ORAL at 20:45

## 2017-05-05 RX ADMIN — HYDROXYZINE PAMOATE 25 MG: 25 CAPSULE ORAL at 15:35

## 2017-05-05 NOTE — BH NOTES
GROUP THERAPY PROGRESS NOTE    Param Mancilla is participating in [place]. Group time: 30 minutes    Personal goal for participation: discharge and family time    Goal orientation: community    Group therapy participation: minimal    Therapeutic interventions reviewed and discussed: guidelines goals and coping skills    Impression of participation: Staff was encouraged to hear how much patient offered but patients overall affect was flat.

## 2017-05-05 NOTE — BSMART NOTE
OCCUPATIONAL THERAPY PROGRESS NOTE    Group Time:  1819  Attendance: The patient attended full group. .  Participation:  The patient participated with moderate elaboration in the activity. Attention:  The patient was able to focus on the activity. Interaction:  The patient occasionally  interacts with others. Some difficulty with concept of illness vs intentional behavior or mood. Said her family session went \"good\". Can become irritable if she mishears or misunderstands something, but more able to monitor this on her own.

## 2017-05-05 NOTE — PROGRESS NOTES
Patient with irritable affect and mood, stated that she is feeling upset because she let her \"family talk her into staying in the hospital until Monday. \" Patient requested medication for anxiety; patient given Vistaril 25 mg po for feeling irritated and anxiety; offered 1:1 as needed; maintain safe environment.

## 2017-05-05 NOTE — PROGRESS NOTES
Behavioral Health Progress Note      5/5/2017    [facility]    Current Diagnosis:  Axis I:Schizoaffective Disorder, Bipolar type  THC Use Disorder, continuous  Axis II:Deferred  Axis III:Tachycardia  Axis IV:Relatively homeless, fixed income, unemployed, poor social supports, chronic drug use  Axis V:45      Report from the nursing staff changes in the medical condition while patient has been on the unit:  See notes from today. Patient Vitals for the past 24 hrs:   Temp Pulse Resp BP   05/05/17 0829 98.7 °F (37.1 °C) (!) 105 16 (!) 147/92   05/04/17 1840 98.7 °F (37.1 °C) 87 16 133/88       No results found for this or any previous visit (from the past 24 hour(s)). Sleep:has evidence of insomnia    Intake: Poor    Patient Comments: Calm and co-operative on meeting with me today. Lucid. Family meeting was held yesterday, and moderated by Simran Valencia who was present and her Mom, please refer to their notes for details. Joana Doherty is insistent that patient is not yet ready to come home, as she still hears here cursing on the unit whenever something does not go right, such as getting the wrong food tray that happened this afternoon. Denies SI/HI, no AH/VH. Med compliant. Oriented X4. No major manic behaviors. Mental Status Exam:  Appearance:is with improved hygiene and is no longer bizarre in action or apperance  Behavior: controlled  Motor: Euthymic  Speech: low voiced  Mood: Less irritable and labile  Affect: Mood congruent, flat  Thought Process: Logical, coherent, goal-directed, nodelusional.  Thought Content: paranoia  Perception:None  Cognition: stable attention/concentration, not with impaired decision making and impulsive  Insight: The patient shows little insight  Judgment: is improving and does not seem cognitively impaired  Medications:      Treatment Plan:  Schizoaffective D/O-Continue with Zyprexa, and Lithium as dosed, see MAR.  She is still a little bit irritable, manic like, will hold over the weekend for observation, and safety. Lithium level is 0.8; we got a lipid panel, and A1c, repeat Lithium on Monday. Continue current treatment plan. Anticipated Discharge:  Possibly Monday next week.     Anne Little MD  5/5/2017

## 2017-05-05 NOTE — PROGRESS NOTES
Problem: Falls - Risk of  Goal: *Absence of falls  Patient will remain free of falls daily during hospital stay. Outcome: Progressing Towards Goal  Free from falls    Problem: Psychosis  Goal: *STG: Participates in individual and group therapy  Patient will attend and participate in 2-3 groups daily while in the hospital.   Outcome: Progressing Towards Goal  Participates in group  Goal: *STG/LTG: Complies with medication therapy  Patient will comply with medication daily during hospital.   Outcome: Progressing Towards Goal  Compliant with medication    Comments:   Patient discussed family meeting held yesterday. Pt states she was glad to have the family meeting. She states \" I need to keep taking my medicine so I can take care of my daughter. She agreed to try utilizing her coping strategies. She plans to listen to music to keep her calm.

## 2017-05-05 NOTE — BSMART NOTE
ART THERAPY GROUP PROGRESS NOTE    PATIENT SCHEDULED FOR GROUP AT: 14:15    ATTENDANCE: Full    PARTICIPATION LEVEL: Participates fully in the art process    ATTENTION LEVEL: Able to focus on task    FOCUS: Goals     SYMBOLIC & THEMATIC CONTENT AS NOTED IN IMAGERY: She was calm, invested in the task at hand, and occasionally interacted with group members. She claimed that her main goal is to work on her anger management, stating that she is easily angered and reactive when she does not get her way. She claimed that she wants to manage her anger healthier for herself and her daughter. Means to manage anger identified included \"walk away, breathing techniques, and anger management classes. \"

## 2017-05-06 PROCEDURE — 74011250637 HC RX REV CODE- 250/637: Performed by: PSYCHIATRY & NEUROLOGY

## 2017-05-06 PROCEDURE — 65220000003 HC RM SEMIPRIVATE PSYCH

## 2017-05-06 RX ADMIN — LITHIUM CARBONATE 600 MG: 300 TABLET ORAL at 20:18

## 2017-05-06 RX ADMIN — OLANZAPINE 20 MG: 5 TABLET, ORALLY DISINTEGRATING ORAL at 20:18

## 2017-05-06 RX ADMIN — CLONAZEPAM 0.5 MG: 0.5 TABLET ORAL at 20:18

## 2017-05-06 RX ADMIN — LITHIUM CARBONATE 300 MG: 300 TABLET ORAL at 13:43

## 2017-05-06 RX ADMIN — CLONAZEPAM 0.5 MG: 0.5 TABLET ORAL at 08:06

## 2017-05-06 RX ADMIN — LITHIUM CARBONATE 300 MG: 300 TABLET ORAL at 08:06

## 2017-05-06 RX ADMIN — PROPRANOLOL HYDROCHLORIDE 20 MG: 10 TABLET ORAL at 08:06

## 2017-05-06 RX ADMIN — OLANZAPINE 5 MG: 5 TABLET, ORALLY DISINTEGRATING ORAL at 08:07

## 2017-05-06 RX ADMIN — PROPRANOLOL HYDROCHLORIDE 20 MG: 10 TABLET ORAL at 20:18

## 2017-05-06 NOTE — PROGRESS NOTES
Problem: Psychosis  Goal: *STG: Participates in individual and group therapy  Patient will attend and participate in 2-3 groups daily while in the hospital.   Outcome: Progressing Towards Goal  Pt attended and participated in scheduled groups  Goal: *STG/LTG: Complies with medication therapy  Patient will comply with medication daily during hospital.   Outcome: Progressing Towards Goal  Pt compliant with prescribed medications    Comments:   Pt isolating to self for majority of shift; in and out of day area. Pt denies SI. Pt has not displayed any aggressive behaviors. Pt expressed concern over discharge plan as to where she was going to go when discharged on Monday. Pt compliant with meals and meds. Rounds Q 15 mins for safety.  Staff will continue to offer a safe and supportive environment

## 2017-05-06 NOTE — BH NOTES
MHT Note:  The Pt was present in the day area majority of the shift. The Pt did take his medication with no problem. The Pt did eat her meals and snack with no problem. The Pt interacted with her peers and staff with no problem. The Pt did not have any visitors, but was seen on the phone several times. The Pt at times became loud appearing to be arguing with the person she was on the phone with. The Pt did not need any redirection, but was able to calm herself. The Pt stated she was doing well and was looking forward to being discharged on Monday. She is concerned about where she was going. She stated her family had \"turned their back on her\". The Pt denied any SI/HI and AH/VH. The Pt contracts for safety. The Pt was reminded to keep socks and/or shoes on her feet to avoid slips and/or falls.

## 2017-05-06 NOTE — BH NOTES
GROUP THERAPY PROGRESS NOTE    Marjorie Lira is participating in [place].      Group time: 30 minutes    Personal goal for participation: discuss daily Tx goal(s), discuss guideline compliance, unit issues and community announcements           Goal orientation: personal    Group therapy participation: active

## 2017-05-07 LAB — LITHIUM SERPL-SCNC: 1.15 MMOL/L (ref 0.6–1.2)

## 2017-05-07 PROCEDURE — 36415 COLL VENOUS BLD VENIPUNCTURE: CPT | Performed by: PSYCHIATRY & NEUROLOGY

## 2017-05-07 PROCEDURE — 74011250637 HC RX REV CODE- 250/637: Performed by: PSYCHIATRY & NEUROLOGY

## 2017-05-07 PROCEDURE — 80178 ASSAY OF LITHIUM: CPT | Performed by: PSYCHIATRY & NEUROLOGY

## 2017-05-07 PROCEDURE — 65220000003 HC RM SEMIPRIVATE PSYCH

## 2017-05-07 RX ADMIN — CLONAZEPAM 0.5 MG: 0.5 TABLET ORAL at 08:26

## 2017-05-07 RX ADMIN — CLONAZEPAM 0.5 MG: 0.5 TABLET ORAL at 20:24

## 2017-05-07 RX ADMIN — OLANZAPINE 5 MG: 5 TABLET, ORALLY DISINTEGRATING ORAL at 08:26

## 2017-05-07 RX ADMIN — OLANZAPINE 20 MG: 5 TABLET, ORALLY DISINTEGRATING ORAL at 20:24

## 2017-05-07 RX ADMIN — LITHIUM CARBONATE 300 MG: 300 TABLET ORAL at 13:55

## 2017-05-07 RX ADMIN — LITHIUM CARBONATE 300 MG: 300 TABLET ORAL at 08:26

## 2017-05-07 RX ADMIN — PROPRANOLOL HYDROCHLORIDE 20 MG: 10 TABLET ORAL at 08:26

## 2017-05-07 RX ADMIN — HYDROXYZINE PAMOATE 25 MG: 25 CAPSULE ORAL at 01:06

## 2017-05-07 RX ADMIN — PROPRANOLOL HYDROCHLORIDE 20 MG: 10 TABLET ORAL at 20:24

## 2017-05-07 RX ADMIN — LITHIUM CARBONATE 600 MG: 300 TABLET ORAL at 20:24

## 2017-05-07 NOTE — PROGRESS NOTES
Behavioral Health Progress Note      5/7/2017    Abby Sauer    Current Diagnosis:  Axis I:Schizoaffective Disorder, Bipolar type  THC Use Disorder, continuous  Axis II:Deferred  Axis III:Tachycardia  Axis IV:Relatively homeless, fixed income, unemployed, poor social supports, chronic drug use  Axis V:45      Report from the nursing staff changes in the medical condition while patient has been on the unit:  See notes from today. Patient Vitals for the past 24 hrs:   Temp Pulse Resp BP   05/07/17 0745 97.7 °F (36.5 °C) 94 20 (!) 155/96   05/06/17 2003 98.9 °F (37.2 °C) 86 16 (!) 146/95       Recent Results (from the past 24 hour(s))   LITHIUM    Collection Time: 05/07/17  6:15 AM   Result Value Ref Range    Lithium 1.15 0.6 - 1.2 MMOL/L       Sleep:has evidence of insomnia    Intake: Poor    Patient Comments: Calm and co-operative on meeting with me today. Lucid. Her [address] Mother has been calling and calling since yesterday,and finally let me know that it is very unlikely that patient will be able to return back to her place to live, as her  has said a firm No. [address] Mother has has informed her. She is very tearful today, depressed. But she is learning to appreciate disappointment, reset her self, and even understand how her poor med compliance has affected her, and everyone around her. Denies SI/HI, no AH/VH. Med compliant. Oriented X4. No more manic behaviors.      Mental Status Exam:  Appearance:is with improved hygiene and is no longer bizarre in action or apperance  Behavior: controlled  Motor: Euthymic  Speech: low voiced  Mood: Less irritable and labile  Affect: Mood congruent, flat  Thought Process: Logical, coherent, goal-directed, nodelusional.  Thought Content: paranoia  Perception:None  Cognition: stable attention/concentration, not with impaired decision making and impulsive  Insight: The patient shows little insight  Judgment: is improving and does not seem cognitively impaired  Medications:      Treatment Plan:  Schizoaffective D/O-Continue with Zyprexa, and Lithium as dosed, see MAR. She is still a little bit irritable, manic like, will hold over the weekend for observation, and safety. Lithium level is 0.8; we got a lipid panel, and A1c, repeatedt Lithium level today on total dose of 1200 mg/day. If family refuses to take her in, will consider REACH as a sort of stepdown, but how to pay? Reinvestment? This is my preferred plan, as i am very leery of her going back to a place that is not acceptable of her-it seems defeatist, will have all sort of problems. Discuss with SW tomorrow. Continue current treatment plan.     Anticipated Discharge:  TBD Mendel Roch, MD  5/7/2017

## 2017-05-07 NOTE — BH NOTES
GROUP THERAPY PROGRESS NOTE    Serena Shukla is participating in [place].      Group time: 30 minutes    Personal goal for participation: verify insight and reality orientation, discuss unit issues and guideline compliance    Goal orientation: personal    Group therapy participation: active

## 2017-05-07 NOTE — PROGRESS NOTES
Problem: Falls - Risk of  Goal: *Absence of falls  Patient will remain free of falls daily during hospital stay. Outcome: Progressing Towards Goal  No falls. Problem: Psychosis  Goal: *STG/LTG: Complies with medication therapy  Patient will comply with medication daily during hospital.   Outcome: Progressing Towards Goal  Med compliant. Problem: Psychosis  Goal: *STG: Decreased delusional thinking  Patient will demonstrate improved thought processes daily during hospital stay. Outcome: Progressing Towards Goal  Denies delusions. Comments:   Pt been mostly in her room, after eating her breakfast. Cooperative/ brief 1:1, stated feeling good and ready to go home. Denied hallucinations depression and ideations however she appears a little sad. Stated she feels sad because she think her grandmother don't want her back in her home, think her grandmother don't trust her. Remains med and diet compliant. Behavior been calm/ quiet.

## 2017-05-08 VITALS
DIASTOLIC BLOOD PRESSURE: 86 MMHG | TEMPERATURE: 97.1 F | SYSTOLIC BLOOD PRESSURE: 148 MMHG | HEART RATE: 89 BPM | RESPIRATION RATE: 16 BRPM | HEIGHT: 65 IN | BODY MASS INDEX: 26.66 KG/M2 | WEIGHT: 160 LBS

## 2017-05-08 PROCEDURE — 74011250637 HC RX REV CODE- 250/637: Performed by: PSYCHIATRY & NEUROLOGY

## 2017-05-08 RX ORDER — HYDROXYZINE PAMOATE 50 MG/1
50 CAPSULE ORAL
Qty: 42 CAP | Refills: 0 | Status: SHIPPED | OUTPATIENT
Start: 2017-05-08 | End: 2017-05-22

## 2017-05-08 RX ORDER — OLANZAPINE 5 MG/1
5 TABLET, ORALLY DISINTEGRATING ORAL DAILY
Qty: 30 TAB | Refills: 0 | Status: SHIPPED | OUTPATIENT
Start: 2017-05-08 | End: 2017-06-07

## 2017-05-08 RX ORDER — LITHIUM CARBONATE 300 MG
300 TABLET ORAL 2 TIMES DAILY
Qty: 60 TAB | Refills: 0 | Status: SHIPPED | OUTPATIENT
Start: 2017-05-08 | End: 2017-06-07

## 2017-05-08 RX ORDER — OLANZAPINE 20 MG/1
20 TABLET, ORALLY DISINTEGRATING ORAL
Qty: 30 TAB | Refills: 0 | Status: SHIPPED | OUTPATIENT
Start: 2017-05-08 | End: 2017-06-07

## 2017-05-08 RX ORDER — LITHIUM CARBONATE 300 MG
600 TABLET ORAL
Qty: 60 TAB | Refills: 0 | Status: SHIPPED | OUTPATIENT
Start: 2017-05-08 | End: 2017-06-07

## 2017-05-08 RX ORDER — PROPRANOLOL HYDROCHLORIDE 20 MG/1
20 TABLET ORAL 2 TIMES DAILY
Qty: 60 TAB | Refills: 0 | Status: SHIPPED | OUTPATIENT
Start: 2017-05-08 | End: 2017-06-07

## 2017-05-08 RX ADMIN — HYDROXYZINE PAMOATE 25 MG: 25 CAPSULE ORAL at 01:58

## 2017-05-08 RX ADMIN — CLONAZEPAM 0.5 MG: 0.5 TABLET ORAL at 08:18

## 2017-05-08 RX ADMIN — PROPRANOLOL HYDROCHLORIDE 20 MG: 10 TABLET ORAL at 08:18

## 2017-05-08 RX ADMIN — OLANZAPINE 5 MG: 5 TABLET, ORALLY DISINTEGRATING ORAL at 08:19

## 2017-05-08 RX ADMIN — LITHIUM CARBONATE 300 MG: 300 TABLET ORAL at 13:30

## 2017-05-08 RX ADMIN — LITHIUM CARBONATE 300 MG: 300 TABLET ORAL at 08:18

## 2017-05-08 NOTE — BH NOTES
Patient stated that she was having anxiety patient given Vistaril for anxiety will continue to monitor.

## 2017-05-08 NOTE — PROGRESS NOTES
Patient has been exceptionally more agreeable today. Patient has engaged in social behavior and learned new skill sets. Patient has proven a heightened sense of irritability when dealing with family and difficult/stressful situations. Staff will continue to monitor and encourage healthy behaviors.

## 2017-05-08 NOTE — BSMART NOTE
SW Contact:  Writer made call to pt's family for Rie Davis #[phone] to discuss d/c plans & disposition. Left message on voice mail to call us back since there has been indecisiveness regarding where pt will be d/c to.

## 2017-05-08 NOTE — BH NOTES
GROUP THERAPY PROGRESS NOTE    Luke John is participating in [place].      Group time: 15 minutes    Personal goal for participation: talk w/family    Goal orientation: community    Group therapy participation: active    Therapeutic interventions reviewed and discussed: rules and personal gaols

## 2017-05-08 NOTE — DISCHARGE SUMMARY
[facility]  Discharge Summary     Patient ID:  Abby Sauer  [id number]  23 y.o.  1995    Admit date: 4/26/2017    Discharge date and time: 5/8/2017  2:15 PM     Admission Diagnoses: Bipolar 1 Disorder  Bipolar 1 disorder [facility])    Discharge Diagnoses:   Axis I:Schizoaffective Disorder, Bipolar type  THC Use Disorder, continuous  Axis II:Deferred  Axis III:Tachycardia  Axis IV:Relatively homeless, fixed income, unemployed, poor social supports, chronic drug use  Axis V:45. Problem List as of 5/8/2017  Never Reviewed          Codes Class Noted - Resolved    Bipolar 1 disorder ([facility] ICD-10-CM: F31.9  ICD-9-CM: 296.7  11/12/2016 - Present              Disposition: home    Discharged Condition: good    Past Medical History:   Diagnosis Date    Aggressive outburst     Anxiety disorder     Bipolar 1 disorder ([facility]     Depression     Depression     Diabetes ([facility]     Suicidal thoughts       Family History   Problem Relation Age of Onset    No Known Problems Mother     No Known Problems Father       Social History   Substance Use Topics    Smoking status: Current Every Day Smoker    Smokeless tobacco: Never Used    Alcohol use Yes     No past surgical history on file. Prior to Admission medications    Medication Sig Start Date End Date Taking? Authorizing Provider   hydrOXYzine pamoate (VISTARIL) 50 mg capsule Take 1 Cap by mouth three (3) times daily as needed for Anxiety for up to 14 days. Indications: anxiety 5/8/17 5/22/17 Yes Anne Little MD   lithium carbonate 300 mg tablet Take 1 Tab by mouth two (2) times a day for 30 days. Indications: Kalee associated with Bipolar Disorder 5/8/17 6/7/17 Yes Anne Little MD   lithium carbonate 300 mg tablet Take 2 Tabs by mouth nightly for 30 days.  Indications: Kalee associated with Bipolar Disorder 5/8/17 6/7/17 Yes Anne Little MD   OLANZapine (ZYPREXA ZYDIS) 20 mg disintegrating tablet Take 1 Tab by mouth nightly for 30 days. Indications: Kalee associated with Bipolar Disorder 5/8/17 6/7/17 Yes Maite Barone MD   OLANZapine (ZYPREXA ZYDIS) 5 mg disintegrating tablet Take 1 Tab by mouth daily for 30 days. Indications: Kalee associated with Bipolar Disorder 5/8/17 6/7/17 Yes Maite Barone MD   propranolol (INDERAL) 20 mg tablet Take 1 Tab by mouth two (2) times a day for 30 days. Indications: SINUS TACHYCARDIA SECONDARY TO ANTIPSYCHOTIC MEDICATION 5/8/17 6/7/17 Yes Maite Barone MD   lithium carbonate 300 mg capsule Take 1 Cap by mouth daily. Indications: BIPOLAR DISORDER 11/18/16   Lisa Castellanos MD   lithium carbonate 600 mg capsule Take 1 Cap by mouth nightly. Indications: BIPOLAR DISORDER 11/18/16   Lisa Castellanos MD   OLANZapine (ZYPREXA ZYDIS) 15 mg disintegrating tablet Take 1 Tab by mouth nightly. Indications: Mood stabilization 11/18/16   Lisa Castellanos MD   propranolol (INDERAL) 20 mg tablet Take 1 Tab by mouth two (2) times a day. Indications: SINUS TACHYCARDIA SECONDARY TO ANTIPSYCHOTIC MEDICATION 11/18/16   Lisa Castellanos MD     No Known Allergies     Hospital Course: The patient was admitted to the special treatment unit on suicide and assault precautions. She was restarted on home medications of Zyprexa, and Lithium, titrated to current efficacious doses. Refer to progress notes on details of treatment. Precautions were discontinued and patient was transferred to the open Adult Unit. The patient was engaged in individual, group therapies, and occupational therapy. The patient was involved in chemical dependence educational classes and NA/AA. At the time of discharge, the patient denied homicidal or suicidal ideation. Patient  was not psychotic and was capable of self care and competent to make their own financial and medical decisions. The patient has an understanding of treatment recommendations and medication management on discharge.      Discharge Exams: Mental Status exam: WNL   Physical exam: No exam performed today, not required, no physical c/o. Chest X-Ray: none  ECG: N/A    Lab/Data Review:  Lab results reviewed. For significant abnormal values and values requiring intervention, see assessment and plan. Consultations (including impressions and outcomes): None    Psychiatric Testing; Reality oriented    Treatment and Response: Excellent    Significant adverse reaction to drugs: none    Procedures/Operations: None    Current Discharge Medication List      START taking these medications    Details   hydrOXYzine pamoate (VISTARIL) 50 mg capsule Take 1 Cap by mouth three (3) times daily as needed for Anxiety for up to 14 days. Indications: anxiety  Qty: 42 Cap, Refills: 0      !! lithium carbonate 300 mg tablet Take 1 Tab by mouth two (2) times a day for 30 days. Indications: Kalee associated with Bipolar Disorder  Qty: 60 Tab, Refills: 0      !! lithium carbonate 300 mg tablet Take 2 Tabs by mouth nightly for 30 days. Indications: Kalee associated with Bipolar Disorder  Qty: 60 Tab, Refills: 0       !! - Potential duplicate medications found. Please discuss with provider. CONTINUE these medications which have CHANGED    Details   !! OLANZapine (ZYPREXA ZYDIS) 20 mg disintegrating tablet Take 1 Tab by mouth nightly for 30 days. Indications: Kalee associated with Bipolar Disorder  Qty: 30 Tab, Refills: 0      !! OLANZapine (ZYPREXA ZYDIS) 5 mg disintegrating tablet Take 1 Tab by mouth daily for 30 days. Indications: Kalee associated with Bipolar Disorder  Qty: 30 Tab, Refills: 0      propranolol (INDERAL) 20 mg tablet Take 1 Tab by mouth two (2) times a day for 30 days. Indications: SINUS TACHYCARDIA SECONDARY TO ANTIPSYCHOTIC MEDICATION  Qty: 60 Tab, Refills: 0       !! - Potential duplicate medications found. Please discuss with provider.       STOP taking these medications       lithium carbonate 300 mg capsule Comments:   Reason for Stopping: lithium carbonate 600 mg capsule Comments:   Reason for Stopping:                 Activity: Activity as tolerated  Diet: Low fat, Low cholesterol      Follow-up with:  Pt. Has a scheduled therapy appointment with Mrs. Apodaca @ 3:00 on 5/12/17 and  [facility] for medication management on 5/24/17 @ 3:00 and  [place] [address] [address]  Phone: [phone]    Copy of D/C summary to: None    Signed:  Anne Little MD  5/8/2017  1:28 PM

## 2017-05-08 NOTE — DISCHARGE INSTRUCTIONS
BEHAVIORAL HEALTH NURSING DISCHARGE NOTE      The following personal items collected during your admission are returned to you:   Dental Appliance: Dental Appliances: None  Vision:    Hearing Aid:    Jewelry: Jewelry: None  Clothing: Clothing: Pants, Shirt (1 pr yellow flip-flops)  Other Valuables: Other Valuables: None  Valuables sent to safe:        PATIENT INSTRUCTIONS:    .  Regular diet      The discharge information has been reviewed with the patient. The patient verbalized understanding.       Patient armband removed and shredded

## 2017-05-08 NOTE — PROGRESS NOTES
Behavioral Health Progress Note      5/8/2017    Dandy Carrington    Current Diagnosis:  Axis I:Schizoaffective Disorder, Bipolar type  THC Use Disorder, continuous  Axis II:Deferred  Axis III:Tachycardia  Axis IV:Relatively homeless, fixed income, unemployed, poor social supports, chronic drug use  Axis V:45      Report from the nursing staff changes in the medical condition while patient has been on the unit:  See notes from today. Patient Vitals for the past 24 hrs:   Temp Pulse Resp BP   05/08/17 0746 97.1 °F (36.2 °C) 89 16 148/86   05/07/17 1800 98.4 °F (36.9 °C) 94 15 134/84       No results found for this or any previous visit (from the past 24 hour(s)). Sleep:has evidence of insomnia    Intake: Poor    Patient Comments: She was irate when i told her to give me some more time to call ehr grandmother, as they told me yesterday that they do not want her in the house with them anymore. Patient got immediately upset, and became physically threatening, slammed a door knob into the wall, and displayed inappropriate behaviors, out of frustration that she may not be discharged today; and loudly stated her grandma told her to come home today. Patient discharge order prepared. I finally called the grandma again, and she confirmed she does NOT want the patient in her home. Neither does the Mother. Patient still insistent on going, and will be discharged home to a shelter today. Denies SI/HI, no AH/VH. Med compliant. Oriented X4. No more manic behaviors.      Mental Status Exam:  Appearance:is with improved hygiene and is no longer bizarre in action or apperance  Behavior: controlled  Motor: Euthymic  Speech: low voiced  Mood: Less irritable and labile  Affect: Mood congruent, flat  Thought Process: Logical, coherent, goal-directed, nodelusional.  Thought Content: paranoia  Perception:None  Cognition: stable attention/concentration, not with impaired decision making and impulsive  Insight: The patient shows little insight  Judgment: is improving and does not seem cognitively impaired  Medications:      Treatment Plan:  Schizoaffective D/O-Continue with Zyprexa, and Lithium as dosed, see MAR. Lithium level is 0.8; we got a lipid panel, and A1c, repeated Lithium level yesterday on total dose of 1200 mg/day. D/C Home today    Anticipated Discharge: Today.     Gabriel Lee MD  5/8/2017

## 2017-05-08 NOTE — BSMART NOTE
SW Contact:  Completed phone call with family Ms Georges Good #[phone]. We discussed pt's living situation. Family can NOT take her back home, they already taking care of her x7 yr old brother & x7 yr old son. .. Ms Nito Funez agreed pt needs to be more responsible & follow thru with outpt tx plan. .. Gave her names of contacts for Shelters in [facility], the same I'm giving to pt after this phone call. .. Also pt need go to [address] [place] [address] Ave to start applying for SSI. ... Pt brother will likely pick pt up & take her to Shelter Location.

## 2017-05-08 NOTE — BH NOTES
Discharge instructions explained, copy given to pt. Discharged at [phone] with her belongings with her brother.

## 2017-06-20 ENCOUNTER — HOSPITAL ENCOUNTER (OUTPATIENT)
Dept: VASCULAR SURGERY | Age: 22
Discharge: HOME | End: 2017-06-20
Attending: INTERNAL MEDICINE
Payer: COMMERCIAL

## 2017-06-20 DIAGNOSIS — R60.0 PEDAL EDEMA: ICD-10-CM

## 2017-06-20 PROCEDURE — 93970 EXTREMITY STUDY: CPT

## 2017-06-20 NOTE — PROCEDURES
hospitals  *** FINAL REPORT ***    Name: Cuong Mullins  MRN: [id number]    Outpatient  : 1995  HIS Order #: [id number]  [address] [facility] Visit #: [id number]  Date: 2017    TYPE OF TEST: Peripheral Venous Testing    REASON FOR TEST    Right Leg:-  Deep venous thrombosis:           No  Superficial venous thrombosis:    No  Deep venous insufficiency:        Not examined  Superficial venous insufficiency: Not examined    Left Leg:-  Deep venous thrombosis:           No  Superficial venous thrombosis:    No  Deep venous insufficiency:        Not examined  Superficial venous insufficiency: Not examined      INTERPRETATION/FINDINGS  Duplex images were obtained using 2-D gray scale, color flow, and  spectral Doppler analysis. Right leg :  1. Deep vein(s) visualized include the common femoral, proximal  femoral, mid femoral, distal femoral, popliteal(above knee),  popliteal(fossa), popliteal(below knee), posterior tibial and peroneal   veins. 2. No evidence of deep venous thrombosis detected in the veins  visualized. 3. Superficial vein(s) visualized include the great saphenous vein. 4. No evidence of superficial thrombosis detected. Left leg :  1. Deep vein(s) visualized include the common femoral, proximal  femoral, mid femoral, distal femoral, popliteal(above knee),  popliteal(fossa), popliteal(below knee), posterior tibial and peroneal   veins. 2. No evidence of deep venous thrombosis detected in the veins  visualized. 3. Superficial vein(s) visualized include the great saphenous vein. 4. No evidence of superficial thrombosis detected. ADDITIONAL COMMENTS    I have personally reviewed the data relevant to the interpretation of  this  study.     TECHNOLOGIST: Jose G Batista, [facility], RVT/  Signed: 2017 02:51 PM    PHYSICIAN: Bari Davis MD  Signed: 2017 03:18 PM

## 2017-06-29 ENCOUNTER — HOSPITAL ENCOUNTER (OUTPATIENT)
Dept: GENERAL RADIOLOGY | Age: 22
Discharge: HOME | End: 2017-06-29
Payer: COMMERCIAL

## 2017-06-29 DIAGNOSIS — Z30.46 CHECKING SUBDERMAL CONTRACEPTIVE: ICD-10-CM

## 2017-06-29 PROCEDURE — 73060 X-RAY EXAM OF HUMERUS: CPT

## 2017-12-21 ENCOUNTER — APPOINTMENT (OUTPATIENT)
Dept: GENERAL RADIOLOGY | Age: 22
End: 2017-12-21
Attending: EMERGENCY MEDICINE
Payer: COMMERCIAL

## 2017-12-21 ENCOUNTER — HOSPITAL ENCOUNTER (EMERGENCY)
Age: 22
Discharge: HOME | End: 2017-12-21
Attending: EMERGENCY MEDICINE | Admitting: EMERGENCY MEDICINE
Payer: COMMERCIAL

## 2017-12-21 VITALS
RESPIRATION RATE: 16 BRPM | HEART RATE: 100 BPM | OXYGEN SATURATION: 100 % | DIASTOLIC BLOOD PRESSURE: 95 MMHG | SYSTOLIC BLOOD PRESSURE: 142 MMHG | TEMPERATURE: 98.1 F

## 2017-12-21 DIAGNOSIS — R07.9 CHEST PAIN, UNSPECIFIED TYPE: Primary | ICD-10-CM

## 2017-12-21 LAB
ANION GAP SERPL CALC-SCNC: 11 MMOL/L (ref 3–18)
ATRIAL RATE: 114 BPM
BASOPHILS # BLD: 0 K/UL (ref 0–0.06)
BASOPHILS NFR BLD: 0 % (ref 0–2)
BUN SERPL-MCNC: 7 MG/DL (ref 7–18)
BUN/CREAT SERPL: 7 (ref 12–20)
CALCIUM SERPL-MCNC: 9.9 MG/DL (ref 8.5–10.1)
CALCULATED P AXIS, ECG09: 55 DEGREES
CALCULATED R AXIS, ECG10: 41 DEGREES
CALCULATED T AXIS, ECG11: 13 DEGREES
CHLORIDE SERPL-SCNC: 100 MMOL/L (ref 100–108)
CK MB CFR SERPL CALC: NORMAL % (ref 0–4)
CK MB SERPL-MCNC: <1 NG/ML (ref 5–25)
CK SERPL-CCNC: 172 U/L (ref 26–192)
CO2 SERPL-SCNC: 25 MMOL/L (ref 21–32)
CREAT SERPL-MCNC: 0.97 MG/DL (ref 0.6–1.3)
D DIMER PPP FEU-MCNC: 0.34 UG/ML(FEU)
DIAGNOSIS, 93000: NORMAL
DIFFERENTIAL METHOD BLD: ABNORMAL
EOSINOPHIL # BLD: 0.1 K/UL (ref 0–0.4)
EOSINOPHIL NFR BLD: 1 % (ref 0–5)
ERYTHROCYTE [DISTWIDTH] IN BLOOD BY AUTOMATED COUNT: 13.3 % (ref 11.6–14.5)
GLUCOSE SERPL-MCNC: 106 MG/DL (ref 74–99)
HCG SERPL QL: NEGATIVE
HCT VFR BLD AUTO: 42.1 % (ref 35–45)
HGB BLD-MCNC: 14 G/DL (ref 12–16)
LIPASE SERPL-CCNC: 107 U/L (ref 73–393)
LITHIUM SERPL-SCNC: 2.37 MMOL/L (ref 0.6–1.2)
LYMPHOCYTES # BLD: 2.6 K/UL (ref 0.9–3.6)
LYMPHOCYTES NFR BLD: 17 % (ref 21–52)
MCH RBC QN AUTO: 28.9 PG (ref 24–34)
MCHC RBC AUTO-ENTMCNC: 33.3 G/DL (ref 31–37)
MCV RBC AUTO: 87 FL (ref 74–97)
MONOCYTES # BLD: 1.3 K/UL (ref 0.05–1.2)
MONOCYTES NFR BLD: 8 % (ref 3–10)
NEUTS SEG # BLD: 11.4 K/UL (ref 1.8–8)
NEUTS SEG NFR BLD: 74 % (ref 40–73)
P-R INTERVAL, ECG05: 124 MS
PLATELET # BLD AUTO: 327 K/UL (ref 135–420)
PMV BLD AUTO: 11.2 FL (ref 9.2–11.8)
POTASSIUM SERPL-SCNC: 3.5 MMOL/L (ref 3.5–5.5)
Q-T INTERVAL, ECG07: 318 MS
QRS DURATION, ECG06: 86 MS
QTC CALCULATION (BEZET), ECG08: 438 MS
RBC # BLD AUTO: 4.84 M/UL (ref 4.2–5.3)
SODIUM SERPL-SCNC: 136 MMOL/L (ref 136–145)
TROPONIN I SERPL-MCNC: <0.02 NG/ML (ref 0–0.06)
VENTRICULAR RATE, ECG03: 114 BPM
WBC # BLD AUTO: 15.4 K/UL (ref 4.6–13.2)

## 2017-12-21 PROCEDURE — 82550 ASSAY OF CK (CPK): CPT | Performed by: EMERGENCY MEDICINE

## 2017-12-21 PROCEDURE — 99284 EMERGENCY DEPT VISIT MOD MDM: CPT

## 2017-12-21 PROCEDURE — 84703 CHORIONIC GONADOTROPIN ASSAY: CPT | Performed by: EMERGENCY MEDICINE

## 2017-12-21 PROCEDURE — 85379 FIBRIN DEGRADATION QUANT: CPT | Performed by: EMERGENCY MEDICINE

## 2017-12-21 PROCEDURE — 93005 ELECTROCARDIOGRAM TRACING: CPT

## 2017-12-21 PROCEDURE — 85025 COMPLETE CBC W/AUTO DIFF WBC: CPT | Performed by: EMERGENCY MEDICINE

## 2017-12-21 PROCEDURE — 80048 BASIC METABOLIC PNL TOTAL CA: CPT | Performed by: EMERGENCY MEDICINE

## 2017-12-21 PROCEDURE — 96360 HYDRATION IV INFUSION INIT: CPT

## 2017-12-21 PROCEDURE — 83690 ASSAY OF LIPASE: CPT | Performed by: EMERGENCY MEDICINE

## 2017-12-21 PROCEDURE — 80178 ASSAY OF LITHIUM: CPT | Performed by: EMERGENCY MEDICINE

## 2017-12-21 PROCEDURE — 71010 XR CHEST PORT: CPT

## 2017-12-21 PROCEDURE — 74011250636 HC RX REV CODE- 250/636: Performed by: EMERGENCY MEDICINE

## 2017-12-21 RX ORDER — SODIUM CHLORIDE 9 MG/ML
1000 INJECTION, SOLUTION INTRAVENOUS ONCE
Status: COMPLETED | OUTPATIENT
Start: 2017-12-21 | End: 2017-12-21

## 2017-12-21 RX ORDER — CHOLECALCIFEROL (VITAMIN D3) 125 MCG
CAPSULE ORAL
COMMUNITY

## 2017-12-21 RX ORDER — LITHIUM CARBONATE 300 MG/1
CAPSULE ORAL 3 TIMES DAILY
COMMUNITY

## 2017-12-21 RX ORDER — OLANZAPINE 10 MG/1
10 TABLET ORAL
COMMUNITY

## 2017-12-21 RX ORDER — MONTELUKAST SODIUM 10 MG/1
10 TABLET ORAL DAILY
COMMUNITY

## 2017-12-21 RX ADMIN — SODIUM CHLORIDE 1000 ML: 900 INJECTION, SOLUTION INTRAVENOUS at 05:09

## 2017-12-21 NOTE — ED TRIAGE NOTES
Patient complaint of chest pain x 2 hours. Patient states that she \"took her pills last night\" but patient unable to state what pills. Patient is very poor historian and seems to be hesitant to answer questions.

## 2017-12-21 NOTE — ED NOTES
Patient states \"I don't like my meds. Cant you guys change my meds? \" patient appears agitated and is not answering questions directly. Patient states that chest pain has resolved.

## 2017-12-21 NOTE — PROGRESS NOTES
Called patient to discuss elevated Lithium level, no answer. Message left requesting patient call department.  Sreekanth Rutledge NP

## 2017-12-21 NOTE — DISCHARGE INSTRUCTIONS
Return for pain, fever, shortness of breath, vomiting, decreased fluid intake, weakness, numbness, dizziness, or any change or concerns. Chest Pain: Care Instructions  Your Care Instructions    There are many things that can cause chest pain. Some are not serious and will get better on their own in a few days. But some kinds of chest pain need more testing and treatment. Your doctor may have recommended a follow-up visit in the next 8 to 12 hours. If you are not getting better, you may need more tests or treatment. Even though your doctor has released you, you still need to watch for any problems. The doctor carefully checked you, but sometimes problems can develop later. If you have new symptoms or if your symptoms do not get better, get medical care right away. If you have worse or different chest pain or pressure that lasts more than 5 minutes or you passed out (lost consciousness), call 911 or seek other emergency help right away. A medical visit is only one step in your treatment. Even if you feel better, you still need to do what your doctor recommends, such as going to all suggested follow-up appointments and taking medicines exactly as directed. This will help you recover and help prevent future problems. How can you care for yourself at home? · Rest until you feel better. · Take your medicine exactly as prescribed. Call your doctor if you think you are having a problem with your medicine. · Do not drive after taking a prescription pain medicine. When should you call for help? Call 911 if:  ? · You passed out (lost consciousness). ? · You have severe difficulty breathing. ? · You have symptoms of a heart attack. These may include:  ¨ Chest pain or pressure, or a strange feeling in your chest.  ¨ Sweating. ¨ Shortness of breath. ¨ Nausea or vomiting.   ¨ Pain, pressure, or a strange feeling in your back, neck, jaw, or upper belly or in one or both shoulders or arms.  ¨ Lightheadedness or sudden weakness. ¨ A fast or irregular heartbeat. After you call 911, the  may tell you to chew 1 adult-strength or 2 to 4 low-dose aspirin. Wait for an ambulance. Do not try to drive yourself. ?Call your doctor today if:  ? · You have any trouble breathing. ? · Your chest pain gets worse. ? · You are dizzy or lightheaded, or you feel like you may faint. ? · You are not getting better as expected. ? · You are having new or different chest pain. Where can you learn more? Go to http://cristina-graciela.info/. Enter A120 in the search box to learn more about \"Chest Pain: Care Instructions. \"  Current as of: March 20, 2017  Content Version: 11.4  © 7035-7026 [employer]. Care instructions adapted under license by [employer] (which disclaims liability or warranty for this information). If you have questions about a medical condition or this instruction, always ask your healthcare professional. [place] [address] any warranty or liability for your use of this information.

## 2017-12-21 NOTE — ED PROVIDER NOTES
EMERGENCY DEPARTMENT HISTORY AND PHYSICAL EXAM    4:29 AM      Date: 12/21/2017  Patient Name: Clarissa Dnubar    History of Presenting Illness     Chief Complaint   Patient presents with    Chest Pain         History Provided By: Patient    Chief Complaint: CP  Duration:  Hours  Timing:  Acute  Location: sternal chest  Quality: Aching  Severity: Mild  Modifying Factors: n/a  Associated Symptoms: denies any other associated signs or symptoms      Additional History (Context): HPI Comments: 4:29 AM Clarissa Dunbar is a 23 y.o. female with a h/o Aggressive outburst, Anxiety disorder, DM, and Smoking presents to the ED with a friend with c/o aching sternal CP onset 1 hour ago that lasted for 10 min and has resolved. Pt stated she has been started taking new bipolar medication 1 week ago and that she took it before going to sleep. Pt denied n/v/d, suicidal ideation, anxiety, SOB, fever, urinary sx's, numbness, weakness, or cough. Pt noted she is unsure if she is pregnant. All other sx denied. No other complaints at this time. PCP-None            PCP: None        Past History     Past Medical History:  Past Medical History:   Diagnosis Date    Aggressive outburst     Anxiety disorder     Bipolar [facility] ([facility] [address]     Depression     Depression     Diabetes ([facility] [address]     Suicidal thoughts        Past Surgical History:  History reviewed. No pertinent surgical history. Family History:  Family History   Problem Relation Age of Onset    No Known Problems Mother     No Known Problems Father        Social History:  Social History   Substance Use Topics    Smoking status: Current Every Day Smoker    Smokeless tobacco: Never Used    Alcohol use Yes       Allergies:  No Known Allergies      Review of Systems     Review of Systems   Constitutional: Negative for fever. HENT: Negative for congestion. Respiratory: Negative for cough and shortness of breath. Cardiovascular: Positive for chest pain. Gastrointestinal: Negative for abdominal pain and vomiting. Musculoskeletal: Negative for back pain. Skin: Negative for rash. Neurological: Negative for light-headedness. Psychiatric/Behavioral: Negative for confusion, dysphoric mood, hallucinations, sleep disturbance and suicidal ideas. The patient is not nervous/anxious. All other systems reviewed and are negative. Physical Exam     Visit Vitals    BP (!) 142/95    Pulse 100    Temp 98.1 °F (36.7 °C)    Resp 16    LMP 12/06/2017    SpO2 100%       Physical Exam   Constitutional: She is oriented to person, place, and time. She appears well-developed. HENT:   Head: Normocephalic. Mouth/Throat: Oropharynx is clear and moist.   Eyes: Pupils are equal, round, and reactive to light. Neck: Normal range of motion. Cardiovascular: Regular rhythm and normal heart sounds. Tachycardia present. No murmur heard. Pulmonary/Chest: Effort normal. She has no wheezes. She has no rales. She exhibits tenderness (mid sternal). Abdominal: Soft. There is no tenderness. Musculoskeletal: Normal range of motion. She exhibits no edema. Neurological: She is alert and oriented to person, place, and time. Skin: Skin is warm and dry. Psychiatric: She has a normal mood and affect. Judgment and thought content normal.   Denies si/hi   Nursing note and vitals reviewed. Medical Decision Making     MDM    Vitals:  No data found. Medications ordered:   Medications   0.9% sodium chloride infusion 1,000 mL (0 mL IntraVENous IV Completed 12/21/17 0547)         Lab findings:  No results found for this or any previous visit (from the past 12 hour(s)). X-Ray, CT or other radiology findings or impressions:  XR CHEST PORT   Final Result          Progress notes, Consult notes or additional Procedure notes:   5:51 AM pain free, no complaints. Not c/w cad/pe/ptx. No emc. Stable for dc and close f/u. Detailed ret inst given.   Pt ageres w dc plan.  Declines waiting for further testing. Says to hold her meds until d/w pcp. Verbalizes und of lithium level pending, says no further sx';s, to f/u w pcp. Disposition:  Diagnosis:   1. Chest pain, unspecified type        Disposition:     Follow-up Information     Follow up With Details Comments [address] Schedule an appointment as soon as possible for a visit in 1 day or [facility], or your physician [address] [facility] [address] [facility]    [address] [facility] Schedule an appointment as soon as possible for a visit in 2 days  [facility] [address]  [address] [facility]           Discharge Medication List as of 12/21/2017  5:51 AM      CONTINUE these medications which have NOT CHANGED    Details   lithium carbonate 300 mg capsule Take  by mouth three (3) times daily. , Historical Med      cholecalciferol, vitamin D3, (VITAMIN D3) 2,000 unit tab Take  by mouth., Historical Med      OLANZapine (ZYPREXA) 10 mg tablet Take 10 mg by mouth nightly., Historical Med      montelukast (SINGULAIR) 10 mg tablet Take 10 mg by mouth daily. , Historical Med               Scribe [address] [place] [address] acting as a scribe for and in the presence of Radames Bello MD      December 22, 2017 at 5:20 AM       Provider Attestation:      I personally performed the services described in the documentation, reviewed the documentation, as recorded by the scribe in my presence, and it accurately and completely records my words and actions.  December 22, 2017 at 5:20 AM - Radames Bello MD

## 2017-12-21 NOTE — ED TRIAGE NOTES
Patient walks with steady gait and does not appear to be in any distress. Patient insisting to use restroom before triage.
